# Patient Record
Sex: FEMALE | Race: WHITE | Employment: FULL TIME | URBAN - METROPOLITAN AREA
[De-identification: names, ages, dates, MRNs, and addresses within clinical notes are randomized per-mention and may not be internally consistent; named-entity substitution may affect disease eponyms.]

---

## 2017-12-09 ENCOUNTER — HOSPITAL ENCOUNTER (EMERGENCY)
Age: 22
Discharge: HOME | End: 2017-12-09
Attending: EMERGENCY MEDICINE
Payer: COMMERCIAL

## 2017-12-09 VITALS
DIASTOLIC BLOOD PRESSURE: 76 MMHG | OXYGEN SATURATION: 98 % | HEART RATE: 79 BPM | BODY MASS INDEX: 29.03 KG/M2 | HEIGHT: 67 IN | TEMPERATURE: 97.6 F | RESPIRATION RATE: 16 BRPM | WEIGHT: 185 LBS | SYSTOLIC BLOOD PRESSURE: 130 MMHG

## 2017-12-09 DIAGNOSIS — T50.B95A ADVERSE REACTION TO INFLUENZA VACCINE, INITIAL ENCOUNTER: Primary | ICD-10-CM

## 2017-12-09 PROCEDURE — 99283 EMERGENCY DEPT VISIT LOW MDM: CPT

## 2017-12-09 PROCEDURE — 74011250637 HC RX REV CODE- 250/637: Performed by: EMERGENCY MEDICINE

## 2017-12-09 RX ORDER — SWAB
1 SWAB, NON-MEDICATED MISCELLANEOUS DAILY
COMMUNITY
End: 2018-08-10

## 2017-12-09 RX ORDER — IBUPROFEN 600 MG/1
600 TABLET ORAL
Status: COMPLETED | OUTPATIENT
Start: 2017-12-09 | End: 2017-12-09

## 2017-12-09 RX ADMIN — IBUPROFEN 600 MG: 600 TABLET, FILM COATED ORAL at 14:38

## 2017-12-09 NOTE — ED PROVIDER NOTES
HPI Comments: 25 y.o. female with past medical history significant for asthma, PCOS, and gastrointestinal order who presents to the ED with chief complaint of right arm redness. Pt states she received a flu shot in her right upper arm yesterday and now reports a localized reaction with redness, pain, and swelling in the area of the injection. Pt states she has had flu shots in the past without any issues. Pt denies taking any medications for her pain. There are no other acute medical complaints voiced at this time. Social Hx: Works as a . PCP: Fariha Joseph MD    Note written by Chad Jones, as dictated by Jamari Miles MD 2:20 PM     The history is provided by the patient. Past Medical History:   Diagnosis Date    Asthma     allergy induced    Gastrointestinal disorder     stress induced    PCOS (polycystic ovarian syndrome)        History reviewed. No pertinent surgical history. History reviewed. No pertinent family history. Social History     Social History    Marital status:      Spouse name: N/A    Number of children: N/A    Years of education: N/A     Occupational History    Not on file. Social History Main Topics    Smoking status: Never Smoker    Smokeless tobacco: Never Used    Alcohol use No    Drug use: No    Sexual activity: No     Other Topics Concern    Not on file     Social History Narrative         ALLERGIES: Penicillins and Tetanus vaccines and toxoid    Review of Systems   Constitutional: Negative for chills and fever. HENT: Negative for ear pain and sore throat. Eyes: Negative for photophobia and pain. Respiratory: Negative for chest tightness and shortness of breath. Cardiovascular: Negative for chest pain and leg swelling. Gastrointestinal: Negative for abdominal pain, nausea and vomiting. Genitourinary: Negative for dysuria and flank pain.    Musculoskeletal: Negative for back pain and neck pain.        +right arm pain, redness, and swelling   Skin: Negative for wound. Neurological: Negative for dizziness, light-headedness and headaches. All other systems reviewed and are negative. Vitals:    12/09/17 1410   BP: 130/76   Pulse: 79   Resp: 16   Temp: 97.6 °F (36.4 °C)   SpO2: 98%   Weight: 83.9 kg (185 lb)   Height: 5' 7\" (1.702 m)            Physical Exam   Constitutional: She is oriented to person, place, and time. She appears well-developed and well-nourished. No distress. HENT:   Head: Normocephalic and atraumatic. Pulmonary/Chest: Effort normal and breath sounds normal. No respiratory distress. She has no wheezes. Neurological: She is alert and oriented to person, place, and time. Skin: She is not diaphoretic. There is erythema. Circular red raised area with tenderness on the right shoulder   Nursing note and vitals reviewed. MDM  Number of Diagnoses or Management Options  Adverse reaction to influenza vaccine, initial encounter:   Diagnosis management comments: Local reaction to influenza vaccine - no respiratory distress. Recommend motrin and benadryl and d/c home.   No EMC at this time    ED Course       Procedures

## 2017-12-09 NOTE — ED TRIAGE NOTES
Patient got flu shot yesterday morning and now has redness, swelling, and tenderness in surrounding area of injection, right arm.

## 2018-08-10 ENCOUNTER — HOSPITAL ENCOUNTER (EMERGENCY)
Age: 23
Discharge: HOME | End: 2018-08-10
Attending: EMERGENCY MEDICINE
Payer: COMMERCIAL

## 2018-08-10 VITALS
HEART RATE: 105 BPM | HEIGHT: 67 IN | OXYGEN SATURATION: 100 % | BODY MASS INDEX: 30.61 KG/M2 | WEIGHT: 195 LBS | SYSTOLIC BLOOD PRESSURE: 122 MMHG | RESPIRATION RATE: 18 BRPM | DIASTOLIC BLOOD PRESSURE: 59 MMHG | TEMPERATURE: 97.9 F

## 2018-08-10 DIAGNOSIS — R10.84 ABDOMINAL PAIN, GENERALIZED: ICD-10-CM

## 2018-08-10 DIAGNOSIS — R11.2 NON-INTRACTABLE VOMITING WITH NAUSEA, UNSPECIFIED VOMITING TYPE: Primary | ICD-10-CM

## 2018-08-10 LAB
ALBUMIN SERPL-MCNC: 3.9 G/DL (ref 3.5–5)
ALBUMIN/GLOB SERPL: 0.9 {RATIO} (ref 1.1–2.2)
ALP SERPL-CCNC: 83 U/L (ref 45–117)
ALT SERPL-CCNC: 35 U/L (ref 12–78)
AMPHET UR QL SCN: NEGATIVE
ANION GAP SERPL CALC-SCNC: 13 MMOL/L (ref 5–15)
APPEARANCE UR: ABNORMAL
AST SERPL-CCNC: 27 U/L (ref 15–37)
BACTERIA URNS QL MICRO: ABNORMAL /HPF
BARBITURATES UR QL SCN: NEGATIVE
BASOPHILS # BLD: 0 K/UL (ref 0–0.1)
BASOPHILS NFR BLD: 0 % (ref 0–1)
BENZODIAZ UR QL: NEGATIVE
BILIRUB SERPL-MCNC: 0.7 MG/DL (ref 0.2–1)
BILIRUB UR QL: NEGATIVE
BUN SERPL-MCNC: 12 MG/DL (ref 6–20)
BUN/CREAT SERPL: 17 (ref 12–20)
CALCIUM SERPL-MCNC: 9.3 MG/DL (ref 8.5–10.1)
CANNABINOIDS UR QL SCN: NEGATIVE
CHLORIDE SERPL-SCNC: 103 MMOL/L (ref 97–108)
CO2 SERPL-SCNC: 23 MMOL/L (ref 21–32)
COCAINE UR QL SCN: NEGATIVE
COLOR UR: ABNORMAL
CREAT SERPL-MCNC: 0.72 MG/DL (ref 0.55–1.02)
DIFFERENTIAL METHOD BLD: ABNORMAL
DRUG SCRN COMMENT,DRGCM: NORMAL
EOSINOPHIL # BLD: 0.1 K/UL (ref 0–0.4)
EOSINOPHIL NFR BLD: 1 % (ref 0–7)
EPITH CASTS URNS QL MICRO: ABNORMAL /LPF
ERYTHROCYTE [DISTWIDTH] IN BLOOD BY AUTOMATED COUNT: 12.3 % (ref 11.5–14.5)
GLOBULIN SER CALC-MCNC: 4.3 G/DL (ref 2–4)
GLUCOSE SERPL-MCNC: 112 MG/DL (ref 65–100)
GLUCOSE UR STRIP.AUTO-MCNC: NEGATIVE MG/DL
HCG UR QL: NEGATIVE
HCT VFR BLD AUTO: 46.5 % (ref 35–47)
HGB BLD-MCNC: 16.7 G/DL (ref 11.5–16)
HGB UR QL STRIP: NEGATIVE
IMM GRANULOCYTES # BLD: 0 K/UL (ref 0–0.04)
IMM GRANULOCYTES NFR BLD AUTO: 0 % (ref 0–0.5)
KETONES UR QL STRIP.AUTO: >80 MG/DL
LEUKOCYTE ESTERASE UR QL STRIP.AUTO: NEGATIVE
LIPASE SERPL-CCNC: 100 U/L (ref 73–393)
LYMPHOCYTES # BLD: 0.3 K/UL (ref 0.8–3.5)
LYMPHOCYTES NFR BLD: 3 % (ref 12–49)
MCH RBC QN AUTO: 31.3 PG (ref 26–34)
MCHC RBC AUTO-ENTMCNC: 35.9 G/DL (ref 30–36.5)
MCV RBC AUTO: 87.2 FL (ref 80–99)
METHADONE UR QL: NEGATIVE
MONOCYTES # BLD: 0.6 K/UL (ref 0–1)
MONOCYTES NFR BLD: 5 % (ref 5–13)
MUCOUS THREADS URNS QL MICRO: ABNORMAL /LPF
NEUTS BAND NFR BLD MANUAL: 4 %
NEUTS SEG # BLD: 10.5 K/UL (ref 1.8–8)
NEUTS SEG NFR BLD: 87 % (ref 32–75)
NITRITE UR QL STRIP.AUTO: NEGATIVE
OPIATES UR QL: NEGATIVE
PCP UR QL: NEGATIVE
PH UR STRIP: 6 [PH] (ref 5–8)
PLATELET # BLD AUTO: 210 K/UL (ref 150–400)
PMV BLD AUTO: 10.6 FL (ref 8.9–12.9)
POTASSIUM SERPL-SCNC: 3.8 MMOL/L (ref 3.5–5.1)
PROT SERPL-MCNC: 8.2 G/DL (ref 6.4–8.2)
PROT UR STRIP-MCNC: ABNORMAL MG/DL
RBC # BLD AUTO: 5.33 M/UL (ref 3.8–5.2)
RBC #/AREA URNS HPF: ABNORMAL /HPF (ref 0–5)
RBC MORPH BLD: ABNORMAL
SODIUM SERPL-SCNC: 139 MMOL/L (ref 136–145)
SP GR UR REFRACTOMETRY: 1.02 (ref 1–1.03)
UR CULT HOLD, URHOLD: NORMAL
UROBILINOGEN UR QL STRIP.AUTO: 0.2 EU/DL (ref 0.2–1)
WBC # BLD AUTO: 11.5 K/UL (ref 3.6–11)
WBC MORPH BLD: ABNORMAL
WBC URNS QL MICRO: ABNORMAL /HPF (ref 0–4)
XXWBCSUS: 0

## 2018-08-10 PROCEDURE — 74011250637 HC RX REV CODE- 250/637: Performed by: EMERGENCY MEDICINE

## 2018-08-10 PROCEDURE — 99284 EMERGENCY DEPT VISIT MOD MDM: CPT

## 2018-08-10 PROCEDURE — 81001 URINALYSIS AUTO W/SCOPE: CPT | Performed by: EMERGENCY MEDICINE

## 2018-08-10 PROCEDURE — 83690 ASSAY OF LIPASE: CPT | Performed by: EMERGENCY MEDICINE

## 2018-08-10 PROCEDURE — 96374 THER/PROPH/DIAG INJ IV PUSH: CPT

## 2018-08-10 PROCEDURE — 80053 COMPREHEN METABOLIC PANEL: CPT | Performed by: EMERGENCY MEDICINE

## 2018-08-10 PROCEDURE — 74011250636 HC RX REV CODE- 250/636: Performed by: EMERGENCY MEDICINE

## 2018-08-10 PROCEDURE — 85025 COMPLETE CBC W/AUTO DIFF WBC: CPT | Performed by: EMERGENCY MEDICINE

## 2018-08-10 PROCEDURE — 96361 HYDRATE IV INFUSION ADD-ON: CPT

## 2018-08-10 PROCEDURE — 36415 COLL VENOUS BLD VENIPUNCTURE: CPT | Performed by: EMERGENCY MEDICINE

## 2018-08-10 PROCEDURE — 81025 URINE PREGNANCY TEST: CPT

## 2018-08-10 PROCEDURE — 96375 TX/PRO/DX INJ NEW DRUG ADDON: CPT

## 2018-08-10 PROCEDURE — 80307 DRUG TEST PRSMV CHEM ANLYZR: CPT | Performed by: EMERGENCY MEDICINE

## 2018-08-10 RX ORDER — HALOPERIDOL 5 MG/ML
5 INJECTION INTRAMUSCULAR
Status: COMPLETED | OUTPATIENT
Start: 2018-08-10 | End: 2018-08-10

## 2018-08-10 RX ORDER — ONDANSETRON 4 MG/1
4 TABLET, ORALLY DISINTEGRATING ORAL
Status: COMPLETED | OUTPATIENT
Start: 2018-08-10 | End: 2018-08-10

## 2018-08-10 RX ORDER — ONDANSETRON 4 MG/1
4 TABLET, ORALLY DISINTEGRATING ORAL
Qty: 10 TAB | Refills: 0 | Status: SHIPPED | OUTPATIENT
Start: 2018-08-10

## 2018-08-10 RX ORDER — KETOROLAC TROMETHAMINE 30 MG/ML
15 INJECTION, SOLUTION INTRAMUSCULAR; INTRAVENOUS
Status: COMPLETED | OUTPATIENT
Start: 2018-08-10 | End: 2018-08-10

## 2018-08-10 RX ADMIN — HALOPERIDOL LACTATE 5 MG: 5 INJECTION, SOLUTION INTRAMUSCULAR at 19:01

## 2018-08-10 RX ADMIN — ONDANSETRON 4 MG: 4 TABLET, ORALLY DISINTEGRATING ORAL at 18:10

## 2018-08-10 RX ADMIN — SODIUM CHLORIDE 1000 ML: 900 INJECTION, SOLUTION INTRAVENOUS at 18:27

## 2018-08-10 RX ADMIN — KETOROLAC TROMETHAMINE 15 MG: 30 INJECTION, SOLUTION INTRAMUSCULAR at 19:00

## 2018-08-10 NOTE — ED NOTES
Patient tolerated PO fluids. Patient discharged with written and verbal discharge instruction on abdominal pain, nausea and vomiting by this RN. Patient verbalized understanding of all instruction and denied any further questions prior to discharge. Patient ambulatory from ED accompanied by family x3. Spouse providing transportation.

## 2018-08-10 NOTE — ED TRIAGE NOTES
Pt states that at about 0300 this afternoon she began with terrible epigastric pain along with at least 30 episodes of vomiting and 8 episodes of loose stools not diarrhea. When she has had this in the past it has been related to an infection like a UTI.   She is also very bloated today

## 2018-08-10 NOTE — ED NOTES
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 Pt states that she was not the only one not wearing gloves to do inventory.

## 2018-08-10 NOTE — ED PROVIDER NOTES
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 Past Medical History:   Diagnosis Date    Asthma     allergy induced    Gastrointestinal disorder     stress induced    PCOS (polycystic ovarian syndrome)        No past surgical history on file. No family history on file. Social History     Social History    Marital status:      Spouse name: N/A    Number of children: N/A    Years of education: N/A     Occupational History    Not on file. Social History Main Topics    Smoking status: Never Smoker    Smokeless tobacco: Never Used    Alcohol use No    Drug use: No    Sexual activity: No     Other Topics Concern    Not on file     Social History Narrative         ALLERGIES: Penicillins and Tetanus vaccines and toxoid    Review of Systems   Constitutional: Positive for appetite change and chills. HENT: Negative for drooling, trouble swallowing and voice change. Eyes: Negative for photophobia. Respiratory: Negative for cough, chest tightness and shortness of breath. Gastrointestinal: Positive for abdominal distention, abdominal pain, diarrhea, nausea and vomiting. Genitourinary: Positive for dysuria. Negative for vaginal bleeding and vaginal discharge. Skin: Negative for rash. Neurological: Negative for dizziness, syncope, weakness and light-headedness. All other systems reviewed and are negative.       There were no vitals filed for this visit. Physical Exam   Constitutional: She is oriented to person, place, and time. She appears well-developed and well-nourished. No distress. Uncomfortable appearing, AxOx4, speaking in complete sentences   HENT:   Head: Normocephalic and atraumatic. Nose: Nose normal.   Mouth/Throat: Oropharynx is clear and moist.   Eyes: Conjunctivae and EOM are normal. Pupils are equal, round, and reactive to light. Right eye exhibits no discharge. Left eye exhibits no discharge. No scleral icterus. Neck: Normal range of motion. Neck supple. No JVD present. No tracheal deviation present. Cardiovascular: Regular rhythm, normal heart sounds and intact distal pulses. Exam reveals no gallop and no friction rub. No murmur heard. tachycardic   Pulmonary/Chest: Effort normal and breath sounds normal. No respiratory distress. She has no wheezes. She has no rales. She exhibits no tenderness. Abdominal: Soft. Bowel sounds are normal. There is no tenderness. There is no rebound and no guarding. nttp     Genitourinary: No vaginal discharge found. Genitourinary Comments: pt has motor/ CV/ Sensation grossly intact to all extremities, R = L in strength;   Musculoskeletal: Normal range of motion. She exhibits no edema or tenderness. Neurological: She is alert and oriented to person, place, and time. She has normal reflexes. No cranial nerve deficit. She exhibits normal muscle tone. Coordination normal.   Skin: Skin is warm and dry. No rash noted. No erythema. No pallor. Psychiatric: She has a normal mood and affect. Her behavior is normal. Thought content normal.   Nursing note and vitals reviewed. [facility]      ED Course       Procedures    6:44 PM  UPT neg;     6:53 PM  'Im still nauseated'; will treat w/ Haldol;     'Feeling much better/ tolerating PO/ agrees w/ plans;   Leandrew Benjamín  results have been reviewed with her. She has been counseled regarding her diagnosis.   She verbally conveys understanding and agreement of the signs, symptoms, diagnosis, treatment and prognosis and additionally agrees to Call/ Arrange follow up as recommended with Dr. Jose Hernandez, JURGEN in 24 - 48 hours. She also agrees with the care-plan and conveys that all of her questions have been answered. I have also put together some discharge instructions for her that include: 1) educational information regarding their diagnosis, 2) how to care for their diagnosis at home, as well a 3) list of reasons why they would want to return to the ED prior to their follow-up appointment, should their condition change or for concerns.

## 2018-08-10 NOTE — DISCHARGE INSTRUCTIONS
Nausea and Vomiting: Care Instructions  Your Care Instructions    When you are nauseated, you may feel weak and sweaty and notice a lot of saliva in your mouth. Nausea often leads to vomiting. Most of the time you do not need to worry about nausea and vomiting, but they can be signs of other illnesses. Two common causes of nausea and vomiting are stomach flu and food poisoning. Nausea and vomiting from viral stomach flu will usually start to improve within 24 hours. Nausea and vomiting from food poisoning may last from 12 to 48 hours. The doctor has checked you carefully, but problems can develop later. If you notice any problems or new symptoms, get medical treatment right away. Follow-up care is a key part of your treatment and safety. Be sure to make and go to all appointments, and call your doctor if you are having problems. It's also a good idea to know your test results and keep a list of the medicines you take. How can you care for yourself at home? · To prevent dehydration, drink plenty of fluids, enough so that your urine is light yellow or clear like water. Choose water and other caffeine-free clear liquids until you feel better. If you have kidney, heart, or liver disease and have to limit fluids, talk with your doctor before you increase the amount of fluids you drink. · Rest in bed until you feel better. · When you are able to eat, try clear soups, mild foods, and liquids until all symptoms are gone for 12 to 48 hours. Other good choices include dry toast, crackers, cooked cereal, and gelatin dessert, such as Jell-O. When should you call for help? Call 911 anytime you think you may need emergency care. For example, call if:    · You passed out (lost consciousness).    Call your doctor now or seek immediate medical care if:    · You have symptoms of dehydration, such as:  ¨ Dry eyes and a dry mouth. ¨ Passing only a little dark urine.   ¨ Feeling thirstier than usual.     · You have new or worsening belly pain.     · You have a new or higher fever.     · You vomit blood or what looks like coffee grounds.    Watch closely for changes in your health, and be sure to contact your doctor if:    · You have ongoing nausea and vomiting.     · Your vomiting is getting worse.     · Your vomiting lasts longer than 2 days.     · You are not getting better as expected. Where can you learn more? Go to http://colten-mariya.info/. Enter 25 [id number] in the search box to learn more about \"Nausea and Vomiting: Care Instructions. \"  Current as of: November 20, 2017  Content Version: 11.7  © 6583-9526 [employer]. Care instructions adapted under license by [employer] (which disclaims liability or warranty for this information). If you have questions about a medical condition or this instruction, always ask your healthcare professional. [address] any warranty or liability for your use of this information. Abdominal Pain: Care Instructions  Your Care Instructions    Abdominal pain has many possible causes. Some aren't serious and get better on their own in a few days. Others need more testing and treatment. If your pain continues or gets worse, you need to be rechecked and may need more tests to find out what is wrong. You may need surgery to correct the problem. Don't ignore new symptoms, such as fever, nausea and vomiting, urination problems, pain that gets worse, and dizziness. These may be signs of a more serious problem. Your doctor may have recommended a follow-up visit in the next 8 to 12 hours. If you are not getting better, you may need more tests or treatment. The doctor has checked you carefully, but problems can develop later. If you notice any problems or new symptoms, get medical treatment right away. Follow-up care is a key part of your treatment and safety.  Be sure to make and go to all appointments, and call your doctor if you are having problems. It's also a good idea to know your test results and keep a list of the medicines you take. How can you care for yourself at home? · Rest until you feel better. · To prevent dehydration, drink plenty of fluids, enough so that your urine is light yellow or clear like water. Choose water and other caffeine-free clear liquids until you feel better. If you have kidney, heart, or liver disease and have to limit fluids, talk with your doctor before you increase the amount of fluids you drink. · If your stomach is upset, eat mild foods, such as rice, dry toast or crackers, bananas, and applesauce. Try eating several small meals instead of two or three large ones. · Wait until 48 hours after all symptoms have gone away before you have spicy foods, alcohol, and drinks that contain caffeine. · Do not eat foods that are high in fat. · Avoid anti-inflammatory medicines such as aspirin, ibuprofen (Advil, Motrin), and naproxen (Aleve). These can cause stomach upset. Talk to your doctor if you take daily aspirin for another health problem. When should you call for help? Call 911 anytime you think you may need emergency care. For example, call if:    · You passed out (lost consciousness).     · You pass maroon or very bloody stools.     · You vomit blood or what looks like coffee grounds.     · You have new, severe belly pain.    Call your doctor now or seek immediate medical care if:    · Your pain gets worse, especially if it becomes focused in one area of your belly.     · You have a new or higher fever.     · Your stools are black and look like tar, or they have streaks of blood.     · You have unexpected vaginal bleeding.     · You have symptoms of a urinary tract infection. These may include:  ¨ Pain when you urinate.   ¨ Urinating more often than usual.  ¨ Blood in your urine.     · You are dizzy or lightheaded, or you feel like you may faint.    Watch closely for changes in your health, and be sure to contact your doctor if:    · You are not getting better after 1 day (24 hours). Where can you learn more? Go to http://colten-mariya.info/. Enter R351 in the search box to learn more about \"Abdominal Pain: Care Instructions. \"  Current as of: November 20, 2017  Content Version: 11.7  © 2278-8577 [employer]. Care instructions adapted under license by [employer] (which disclaims liability or warranty for this information). If you have questions about a medical condition or this instruction, always ask your healthcare professional. [place] [address] any warranty or liability for your use of this information.

## 2018-08-10 NOTE — ED NOTES
Patient provided ginger ale approved by CHARO BROWN for PO challenge at this time. Family x3 remain at bedside.

## 2018-08-10 NOTE — ED NOTES
Bedside shift change report given to Janeen Thompson (oncoming nurse) by Yue Carvajal. Ag Owens RN (offgoing nurse). Report included the following information SBAR, MAR, Pain, vitals, all test results.

## 2018-08-10 NOTE — ED NOTES
Assumed care, pt resting in bed, call bell within reach. Patient provided warm blankets x2 and adjusted bed into position of comfort. Patient reports pain has decreased to 3/10 at this time.

## 2018-08-10 NOTE — Clinical Note
Thank you for allowing us to provide you with medical care today. We realize that you have many choices for your emergency care needs. We thank you for choosing [facility]. Please choose us in the future for any continued health care needs. We hope we addressed all of your medical concerns. We strive to provide excellent quality care in the Emergency Department. Anything less than excellent does not meet our expectations. The exam and treatment you received in the Emergency Departmen t were for an emergent problem and are not intended as complete care. It is important that you follow up with a doctor, nurse practitioner, or [address] [place] [address] assistant for ongoing care. If your symptoms worsen or you do not improve as expected and you are u nable to reach your usual health care provider, you should return to the Emergency Department. We are available 24 hours a day. Take this sheet with you when you go to your follow-up visit. If you have any problem arranging the follow-up visit, c ontact the Emergency Department immediately. Make an appointment your family doctor for follow up of this visit. Return to the ER if you are unable to be seen in a timely manner.

## 2021-06-08 ENCOUNTER — TRANSCRIBE ORDER (OUTPATIENT)
Dept: GENERAL RADIOLOGY | Age: 26
End: 2021-06-08

## 2021-06-08 ENCOUNTER — HOSPITAL ENCOUNTER (OUTPATIENT)
Dept: GENERAL RADIOLOGY | Age: 26
Discharge: HOME | End: 2021-06-08
Payer: OTHER MISCELLANEOUS

## 2021-06-08 DIAGNOSIS — T14.8XXA CONTUSION: Primary | ICD-10-CM

## 2021-06-08 DIAGNOSIS — T14.8XXA CONTUSION: ICD-10-CM

## 2021-06-08 PROCEDURE — 73130 X-RAY EXAM OF HAND: CPT

## 2021-08-17 ENCOUNTER — APPOINTMENT (OUTPATIENT)
Dept: GENERAL RADIOLOGY | Age: 26
End: 2021-08-17
Attending: PHYSICIAN ASSISTANT
Payer: OTHER MISCELLANEOUS

## 2021-08-17 ENCOUNTER — HOSPITAL ENCOUNTER (EMERGENCY)
Age: 26
Discharge: HOME | End: 2021-08-17
Attending: EMERGENCY MEDICINE
Payer: OTHER MISCELLANEOUS

## 2021-08-17 VITALS
DIASTOLIC BLOOD PRESSURE: 87 MMHG | BODY MASS INDEX: 27.47 KG/M2 | RESPIRATION RATE: 16 BRPM | WEIGHT: 175 LBS | HEART RATE: 83 BPM | TEMPERATURE: 97.7 F | HEIGHT: 67 IN | OXYGEN SATURATION: 97 % | SYSTOLIC BLOOD PRESSURE: 146 MMHG
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
## 2021-08-17 DIAGNOSIS — Z23 NEED FOR RABIES VACCINATION: ICD-10-CM

## 2021-08-17 DIAGNOSIS — Z23 NEED FOR TETANUS BOOSTER: ICD-10-CM

## 2021-08-17 PROCEDURE — 99281 EMR DPT VST MAYX REQ PHY/QHP: CPT

## 2021-08-17 PROCEDURE — 73130 X-RAY EXAM OF HAND: CPT

## 2021-08-17 PROCEDURE — 74011250636 HC RX REV CODE- 250/636: Performed by: PHYSICIAN ASSISTANT

## 2021-08-17 PROCEDURE — 90715 TDAP VACCINE 7 YRS/> IM: CPT | Performed by: PHYSICIAN ASSISTANT

## 2021-08-17 PROCEDURE — 96372 THER/PROPH/DIAG INJ SC/IM: CPT

## 2021-08-17 PROCEDURE — 90675 RABIES VACCINE IM: CPT | Performed by: PHYSICIAN ASSISTANT

## 2021-08-17 PROCEDURE — 90471 IMMUNIZATION ADMIN: CPT

## 2021-08-17 PROCEDURE — 90375 RABIES IG IM/SC: CPT | Performed by: PHYSICIAN ASSISTANT

## 2021-08-17 PROCEDURE — 90472 IMMUNIZATION ADMIN EACH ADD: CPT

## 2021-08-17 RX ORDER — BACITRACIN 500 UNIT/G
1 PACKET (EA) TOPICAL
Status: DISCONTINUED | OUTPATIENT
Start: 2021-08-17 | End: 2021-08-17 | Stop reason: HOSPADM

## 2021-08-17 RX ADMIN — RABIES IMMUNE GLOBULIN (HUMAN) 1590 UNITS: 300 INJECTION, SOLUTION INFILTRATION; INTRAMUSCULAR at 15:06

## 2021-08-17 RX ADMIN — RABIES VACCINE 2.5 UNITS: KIT at 15:00

## 2021-08-17 RX ADMIN — TETANUS TOXOID, REDUCED DIPHTHERIA TOXOID AND ACELLULAR PERTUSSIS VACCINE, ADSORBED 0.5 ML: 5; 2.5; 8; 8; 2.5 SUSPENSION INTRAMUSCULAR at 15:03

## 2021-08-17 NOTE — PROGRESS NOTES
8/17/2021  4:16 PM  Case management note    Patient was bit by a dog with no vaccines. 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 I have faxed order and demographics to Carli Orozco. Will continue to follow.   Elizabeth Levy

## 2021-08-17 NOTE — ED TRIAGE NOTES
Patient presents with a dog bite to her left 2nd finger. Dog bit her yesterday and is not vaccinated.

## 2021-08-18 NOTE — ED PROVIDER NOTES
Peggy Swenson is a 32 y.o. female who presents to ED with cc of evaluation of dog bite to left second finger. States this occurred yesterday while she was on a police call while on the highway, was speaking to a man involved in the accident, when his dog jumped up and got her finger. She notes dog is not up-to-date on its rabies vaccinations. States she has rinsed the region well.  urgent care facility yesterday but they were unable to give rabies series. Notes they prescribed her doxycycline. Denies fevers, chills. Note she had a fever and felt somewhat delirious with tetanus vaccine in the past and notes her tetanus is not up-to-date at this time. Pt denies additional symptoms of F/C, cough, congestion, CP, SOB, urinary or fecal changes, syncope, visual changes, neck stiffness. PMHx: Reviewed, as below. PSHx: Reviewed, as below. PCP: None    There are no other complaints verbalized at this time. Past Medical History:   Diagnosis Date    Asthma     allergy induced    Gastrointestinal disorder     stress induced    PCOS (polycystic ovarian syndrome)        No past surgical history on file. No family history on file.     Social History     Socioeconomic History    Marital status:      Spouse name: Not on file    Number of children: Not on file    Years of education: Not on file    Highest education level: Not on file   Occupational History    Not on file   Tobacco Use    Smoking status: Never Smoker    Smokeless tobacco: Never Used   Substance and Sexual Activity    Alcohol use: Yes     Comment: once a month    Drug use: No    Sexual activity: Never   Other Topics Concern    Not on file   Social History Narrative    Not on file     Social Determinants of Health     Financial Resource Strain:     Difficulty of Paying Living Expenses:    Food Insecurity:     Worried About Running Out of Food in the Last Year:     [address] in the Last Year: Transportation Needs:     Lack of Transportation (Medical):  Lack of Transportation (Non-Medical):    Physical Activity:     Days of Exercise per Week:     Minutes of Exercise per Session:    Stress:     Feeling of Stress :    Social Connections:     Frequency of Communication with Friends and Family:     Frequency of Social Gatherings with Friends and Family:     Attends [employer] Services:     Active Member of Clubs or Organizations:     Attends Club or Organization Meetings:     Marital Status:    Intimate Partner Violence:     Fear of Current or Ex-Partner:     Emotionally Abused:     Physically Abused:     Sexually Abused: ALLERGIES: Penicillins and Tetanus vaccines and toxoid    Review of Systems   Constitutional: Negative for chills and fever. HENT: Negative for congestion. Eyes: Negative for visual disturbance. Respiratory: Negative for cough and shortness of breath. Cardiovascular: Negative for chest pain. Gastrointestinal: Negative for constipation and diarrhea. Genitourinary: Negative for dysuria. Musculoskeletal: Positive for arthralgias. Negative for neck pain. Skin: Positive for wound. Neurological: Negative for syncope. All other systems reviewed and are negative. Vitals:    08/17/21 1350   BP: (!) 146/87   Pulse: 83   Resp: 16   Temp: 97.7 °F (36.5 °C)   SpO2: 97%   Weight: 79.4 kg (175 lb)   Height: 5' 7\" (1.702 m)            Physical Exam  Vitals and nursing note reviewed. Constitutional:       Appearance: Normal appearance. She is not toxic-appearing or diaphoretic. HENT:      Head: Atraumatic. Right Ear: External ear normal.      Left Ear: External ear normal.   Eyes:      Conjunctiva/sclera: Conjunctivae normal.   Cardiovascular:      Rate and Rhythm: Normal rate. Pulmonary:      Effort: No respiratory distress. Abdominal:      General: There is no distension. Musculoskeletal:         General: No swelling or deformity. Cervical back: Neck supple. Skin:     General: Skin is warm and dry. Comments: Small abrasion noted skin at base of nail bed of left second digit. Sensation intact distally. Some tenderness to palpation PIP. Flexion extension noted intact. No further tenderness to palpation. Neurological:      Mental Status: She is alert and oriented to person, place, and time. Mental status is at baseline. Gait: Gait normal.          MDM  Number of Diagnoses or Management Options     Amount and/or Complexity of Data Reviewed  Tests in the radiology section of CPT®: ordered and reviewed  Discuss the patient with other providers: yes (Dr. Valerio Walter, ED attending)           Procedures          Patient noted to have had no adverse reaction to either tetanus booster or rabies series. We have discussed close return precautions. VITAL SIGNS:  Vitals:    08/17/21 1350   BP: (!) 146/87   Pulse: 83   Resp: 16   Temp: 97.7 °F (36.5 °C)   SpO2: 97%   Weight: 79.4 kg (175 lb)   Height: 5' 7\" (1.702 m)         LABS:  No results found for this or any previous visit (from the past 24 hour(s)). IMAGING:  XR HAND LT MIN 3 V   Final Result   No acute abnormality. Medications During Visit:  Medications   diph,Pertuss(AC),Tet Vac-PF (BOOSTRIX) suspension 0.5 mL (0.5 mL IntraMUSCular Given 8/17/21 1503)   rabies immune globulin (PF) (HYPERRAB) injection 1,590 Units (1,590 Units IntraMUSCular Given 8/17/21 1506)   rabies vaccine, PCEC (RABAVERT) kit 2.5 Units (2.5 Units IntraMUSCular Given 8/17/21 1500)         DECISION MAKING:    Samm Ford is a 32 y.o. female who comes in as above. Presents afebrile and hemodynamically stable. Had dog bite to her finger yesterday. Had been placed on course of doxycycline by outpatient facility. Wound has been washed thoroughly here and is noted to be small and not amenable to sutures. X-ray without acute abnormality. Tetanus has been updated today.   Rabies immunoglobulin and first dose and series has been administered today and patient has noted no adverse reaction. Will discharge with continued rabies series and case management has been called consulted for such. I have discussed care with ED attending. Pt and I have discussed close return precautions as well as follow up recommendations. Opportunity for questions presented. Pt verbalizes their understanding and agreement with care plan. IMPRESSION:  1. Dog bite, initial encounter    2. Need for tetanus booster    3. Need for rabies vaccination        DISPOSITION:  Discharged      Discharge Medication List as of 8/17/2021  3:41 PM      START taking these medications    Details   rabies vaccine human Diploid, PF, (IMOVAX) 2.5 unit solr injection 1 mL by IntraMUSCular route once for 1 dose. Day 0 -  8/17/2021 - Given in ED  Day 3 -   8/20/2021     Day 7-    8/24/2021      Day 14-  8/31/2021    Indication: Rabies Exposure  Prescription date: 8/17/2021   Time: 3:34 PM, Print, Disp-1 mL, R-0         CONTINUE these medications which have NOT CHANGED    Details   ondansetron (ZOFRAN ODT) 4 mg disintegrating tablet Take 1 Tab by mouth every eight (8) hours as needed for Nausea. , Print, Disp-10 Tab, R-0              Follow-up Information     Follow up With Specialties Details Why [address] [facility]  Schedule an appointment as soon as possible for a visit   [address] [place] [address]  [phone]    [facility] EMERGENCY DEPT Emergency Medicine Go to  As needed [address] [facility]  [phone]            The patient is asked to follow-up with their primary care provider and any other physicians as above in the next several days. They are to call tomorrow for an appointment.   We have discussed strict return precautions and the patient is asked to return promptly for any increased concerns or worsening of symptoms and for return precautions regarding their symptoms today. They can return to this emergency department or any other emergency department. I have discussed with them results as above and presented opportunity for questions. They verbalize their understanding of the aboveand agreement with care plan. Please note that this dictation was completed with "[employer] the computer voice recognition software. Quite often unanticipated grammatical, syntax, homophones, and other interpretive errors are inadvertently transcribed by the computer software. Please disregard these errors. Please excuse any errors that have escaped final proofreading.

## 2021-08-20 ENCOUNTER — HOSPITAL ENCOUNTER (OUTPATIENT)
Dept: INFUSION THERAPY | Age: 26
Discharge: HOME | End: 2021-08-20
Payer: OTHER MISCELLANEOUS

## 2021-08-20 VITALS
BODY MASS INDEX: 27.47 KG/M2 | HEART RATE: 81 BPM | RESPIRATION RATE: 20 BRPM | SYSTOLIC BLOOD PRESSURE: 151 MMHG | DIASTOLIC BLOOD PRESSURE: 83 MMHG | OXYGEN SATURATION: 98 % | HEIGHT: 67 IN | WEIGHT: 175 LBS | TEMPERATURE: 98.7 F

## 2021-08-20 PROCEDURE — 90675 RABIES VACCINE IM: CPT | Performed by: PHYSICIAN ASSISTANT

## 2021-08-20 PROCEDURE — 90471 IMMUNIZATION ADMIN: CPT

## 2021-08-20 PROCEDURE — 96372 THER/PROPH/DIAG INJ SC/IM: CPT

## 2021-08-20 PROCEDURE — 74011250636 HC RX REV CODE- 250/636: Performed by: PHYSICIAN ASSISTANT

## 2021-08-20 RX ADMIN — RABIES VACCINE 2.5 UNITS: KIT at 13:56

## 2021-08-20 NOTE — PROGRESS NOTES
[facility]    Date: 2021    Name: Stefania Escudero    MRN: [id number]         : 1995    Ms. Manoj Dickey arrived ambulatory and in no distress for Rabies Day #3 Injection. Assessment was completed, no acute issues at this time, no new complaints voiced. Ms. Carlos Brink vitals were reviewed. Visit Vitals  BP (!) 151/83 (BP 1 Location: Right upper arm, BP Patient Position: At rest;Sitting)   Pulse 81   Temp 98.7 °F (37.1 °C)   Resp 20   Ht 5' 7\" (1.702 m)   Wt 79.4 kg (175 lb)   SpO2 98%   Breastfeeding No   BMI 27.41 kg/m²       Patient states her blood pressure likely elevated d/t conflict with her mother prior to appointment. Medications:  Medications Administered     rabies vaccine, PCEC (RABAVERT) kit 2.5 Units     Admin Date  2021 Action  Given Dose  2.5 Units Route  IntraMUSCular Administered By  Marily Joyce RN                Given left deltoid    Ms. Ronquillo tolerated treatment well and was discharged from [place] [address] in stable condition. She is to return on  at 1430 for her next appointment.     Aman Covarrubias RN  2021

## 2021-08-24 ENCOUNTER — HOSPITAL ENCOUNTER (OUTPATIENT)
Dept: INFUSION THERAPY | Age: 26
Discharge: HOME | End: 2021-08-24
Payer: OTHER MISCELLANEOUS

## 2021-08-24 VITALS
SYSTOLIC BLOOD PRESSURE: 117 MMHG | BODY MASS INDEX: 27.31 KG/M2 | DIASTOLIC BLOOD PRESSURE: 62 MMHG | OXYGEN SATURATION: 99 % | HEIGHT: 67 IN | WEIGHT: 174 LBS | HEART RATE: 75 BPM | TEMPERATURE: 98 F | RESPIRATION RATE: 18 BRPM

## 2021-08-24 PROCEDURE — 96372 THER/PROPH/DIAG INJ SC/IM: CPT

## 2021-08-24 PROCEDURE — 74011250636 HC RX REV CODE- 250/636: Performed by: PHYSICIAN ASSISTANT

## 2021-08-24 PROCEDURE — 90675 RABIES VACCINE IM: CPT | Performed by: PHYSICIAN ASSISTANT

## 2021-08-24 RX ADMIN — RABIES VACCINE 2.5 UNITS: KIT at 15:29

## 2021-08-24 NOTE — PROGRESS NOTES
[facility]    Date: 2021    Name: Clemente Garrison    MRN: [id number]         : 1995    Ms. Rachael Villegas Arrived ambulatory and in no distress for Day 4 of Rabies Vaccine. Assessment was completed, no acute issues at this time, no new complaints voiced. .    No flowsheet data found. Ms. Naomi Mittal vitals were reviewed. Visit Vitals  /62   Pulse 75   Temp 98 °F (36.7 °C)   Resp 18   Ht 5' 7\" (1.702 m)   Wt 78.9 kg (174 lb)   SpO2 99%   BMI 27.25 kg/m²       Lab results were obtained and reviewed. No results found for this or any previous visit (from the past 12 hour(s)). Medications:  Medications Administered     rabies vaccine, PCEC (RABAVERT) kit 2.5 Units     Admin Date  2021 Action  Given Dose  2.5 Units Route  IntraMUSCular Administered By  Nuzhat Cantrell RN            RUE injection    Ms. Ronquillo tolerated treatment well and was discharged from [place] [address] in stable condition at 1440. She is to return on  at 1600 for her next appointment.     Lewis Morrow RN  2021

## 2021-08-31 ENCOUNTER — HOSPITAL ENCOUNTER (OUTPATIENT)
Dept: INFUSION THERAPY | Age: 26
Discharge: HOME | End: 2021-08-31
Payer: OTHER MISCELLANEOUS

## 2021-08-31 VITALS
DIASTOLIC BLOOD PRESSURE: 82 MMHG | RESPIRATION RATE: 16 BRPM | HEART RATE: 71 BPM | OXYGEN SATURATION: 99 % | TEMPERATURE: 96.9 F | SYSTOLIC BLOOD PRESSURE: 125 MMHG

## 2021-08-31 PROCEDURE — 90675 RABIES VACCINE IM: CPT | Performed by: PHYSICIAN ASSISTANT

## 2021-08-31 PROCEDURE — 74011250636 HC RX REV CODE- 250/636: Performed by: PHYSICIAN ASSISTANT

## 2021-08-31 PROCEDURE — 90471 IMMUNIZATION ADMIN: CPT

## 2021-08-31 RX ADMIN — RABIES VACCINE 2.5 UNITS: KIT at 16:08

## 2021-08-31 NOTE — PROGRESS NOTES
[facility] Progress Note    Date: 2021    Name: Katelynn Hollingsworth    MRN: [id number]         : 1995    Ms. Jazmyn Hurd Arrived ambulatory and in no distress for Rabies Vaccine Injection. Assessment was completed, no acute issues at this time, no new complaints voiced. Ms. Magnolia Centeno vitals were reviewed. Visit Vitals  /82   Pulse 71   Temp 96.9 °F (36.1 °C)   Resp 16   SpO2 99%     Medications:  Medications Administered     rabies vaccine, PCEC (RABAVERT) kit 2.5 Units     Admin Date  2021 Action  Given Dose  2.5 Units Route  IntraMUSCular Administered By  Lester Beatty RN                  Ms. Jazmyn Hurd tolerated treatment well and was discharged from [place] [address] in stable condition.      Janiya Cedillo RN  2021

## 2023-04-18 NOTE — PROGRESS NOTES
Subjective: (As above and below)     Chief Complaint   Patient presents with    [address] [place] [address] Rd is a 29 y.o. female  and presents to the office to establish care. She denies any family history of cardiac disease or early cardiac deaths. She has history of PCOS which is managed by [facility]. Preventative:   Pap smear: Done at [facility] [place]. Immunization:   Refused COVID vaccine- allergy to the preservative. Nutrition Hx:  Diet: protein bar, yogurt, sandwich, protein, vegetables, starch. Only drinks water throughout the day. Exercise: Cycling    She does not smoke, vape or other substances. She does occasionally drink-but rarely. Occupation: [employer] here in [place].     Overall she is doing well and no major concerns today. Reviewed and agree with Nurse Note duplicated in this note. Reviewed PmHx, RxHx, FmHx, SocHx, AllgHx and updated in chart. Current Outpatient Medications   Medication Sig    elagolix (Orilissa) 150 mg tab Orilissa 150 mg tablet    calcium carbonate (CALCIUM 300 PO) calcium    multivit-minerals/folic acid (MULTIVITAMIN GUMMIES PO) multivitamin     No current facility-administered medications for this visit. Allergies   Allergen Reactions    Latex Rash    Penicillins Other (comments) and Hives     'feels like bee stings\"    Tetanus Vaccines And Toxoid Other (comments)     \"delerium\" fever      Past Medical History:   Diagnosis Date    Asthma     allergy induced    Gastrointestinal disorder     stress induced    PCOS (polycystic ovarian syndrome)       History reviewed. No pertinent surgical history.    Family History   Problem Relation Age of Onset    Other Father         polycystic kidney disease      Social History     Socioeconomic History    Marital status:      Spouse name: Not on file    Number of children: Not on file    Years of education: Not on file    Highest education level: Not on file   Occupational History    Not on file Tobacco Use    Smoking status: Never    Smokeless tobacco: Never   Vaping Use    Vaping Use: Never used   Substance and Sexual Activity    Alcohol use: Yes     Comment: once a month    Drug use: No    Sexual activity: Yes     Partners: Male   Other Topics Concern    Not on file   Social History Narrative    Not on file     Social Determinants of Health     Financial Resource Strain: Not on file   Food Insecurity: Not on file   Transportation Needs: Not on file   Physical Activity: Not on file   Stress: Not on file   Social Connections: Not on file   Intimate Partner Violence: Not on file   Housing Stability: Not on file             Review of Systems   Constitutional:  Negative for chills, diaphoresis, fever, malaise/fatigue and weight loss. HENT: Negative. Eyes: Negative. Respiratory:  Negative for cough and shortness of breath. Cardiovascular:  Negative for chest pain, palpitations and leg swelling. Gastrointestinal:  Negative for abdominal pain, blood in stool, constipation, diarrhea, heartburn, melena, nausea and vomiting. Genitourinary:  Negative for dysuria, flank pain, frequency, hematuria and urgency. Musculoskeletal: Negative. Skin: Negative. Neurological:  Negative for dizziness, tingling, tremors, sensory change, speech change, focal weakness, seizures, loss of consciousness, weakness and headaches. Endo/Heme/Allergies: Negative. Psychiatric/Behavioral: Negative. Objective:     Physical Examination:    Visit Vitals  /79 (BP 1 Location: Left upper arm, BP Patient Position: Sitting, BP Cuff Size: Adult)   Pulse 82   Temp 98.2 °F (36.8 °C) (Temporal)   Resp 17   Ht 5' 7\" (1.702 m)   Wt 186 lb (84.4 kg)   SpO2 96%   BMI 29.13 kg/m²       Physical Exam  Vitals and nursing note reviewed. Constitutional:       General: She is not in acute distress. Appearance: Normal appearance. HENT:      Head: Normocephalic.       Right Ear: Tympanic membrane, ear canal and external ear normal.      Left Ear: Tympanic membrane, ear canal and external ear normal.      Nose: Nose normal.      Mouth/Throat:      Mouth: Mucous membranes are moist.      Pharynx: Oropharynx is clear. Eyes:      Extraocular Movements: Extraocular movements intact. Conjunctiva/sclera: Conjunctivae normal.      Pupils: Pupils are equal, round, and reactive to light. Neck:      Thyroid: No thyromegaly or thyroid tenderness. Vascular: No carotid bruit. Cardiovascular:      Rate and Rhythm: Normal rate and regular rhythm. Pulses: Normal pulses. Heart sounds: Normal heart sounds. Pulmonary:      Effort: Pulmonary effort is normal. No respiratory distress. Breath sounds: Normal breath sounds. Abdominal:      General: Bowel sounds are normal. There is no distension. Palpations: Abdomen is soft. Tenderness: There is no abdominal tenderness. Musculoskeletal:      Cervical back: No tenderness. Right lower leg: No edema. Left lower leg: No edema. Lymphadenopathy:      Cervical: No cervical adenopathy. Skin:     General: Skin is warm and dry. Neurological:      General: No focal deficit present. Mental Status: She is alert and oriented to person, place, and time. Mental status is at baseline. Psychiatric:         Mood and Affect: Mood normal.         Behavior: Behavior normal.         Thought Content: Thought content normal.         Judgment: Judgment normal.           3 most recent PHQ Screens 4/19/2023   Little interest or pleasure in doing things Not at all   Feeling down, depressed, irritable, or hopeless Not at all   Total Score PHQ 2 0      Assessment/ Plan:   Differential diagnosis and treatment options reviewed with patient who is in agreement with treatment plan as outlined below. 1. Encounter for medical examination to establish care  Vitals are stable. She is fasting today. Discussed diet, exercise and lifestyle modifications.      2. Polycystic ovary syndrome  Continue follow-up with GYN. 3. Need for hepatitis C screening test  - HEPATITIS C AB; Future  - HEPATITIS C AB    4. Screening for lipid disorders  - LIPID PANEL; Future  - LIPID PANEL    5. Impaired glucose tolerance  - HEMOGLOBIN A1C WITH EAG; Future  - HEMOGLOBIN A1C WITH EAG    6. Thyroid disorder screening  - TSH 3RD GENERATION; Future  - TSH 3RD GENERATION    7. Laboratory exam ordered as part of routine general medical examination  - CBC WITH AUTOMATED DIFF; Future  - METABOLIC PANEL, COMPREHENSIVE; Future  - METABOLIC PANEL, COMPREHENSIVE  - CBC WITH AUTOMATED DIFF       Follow-up and Dispositions    Return if symptoms worsen or fail to improve, for yearly visit unless lab work is abnormal .          Medication Side Effects and Warnings were discussed with patient: yes  Patient Labs were reviewed: yes  Patient Past Records were reviewed:  yes    Verbal and written instructions (see AVS) provided. Patient expresses understanding and agreement of diagnosis and treatment plan.     Darrel Nowak NP

## 2023-04-19 ENCOUNTER — OFFICE VISIT (OUTPATIENT)
Dept: FAMILY MEDICINE CLINIC | Age: 28
End: 2023-04-19
Payer: OTHER GOVERNMENT

## 2023-04-19 VITALS
TEMPERATURE: 98.2 F | HEIGHT: 67 IN | WEIGHT: 186 LBS | BODY MASS INDEX: 29.19 KG/M2 | HEART RATE: 82 BPM | RESPIRATION RATE: 17 BRPM | DIASTOLIC BLOOD PRESSURE: 79 MMHG | OXYGEN SATURATION: 96 % | SYSTOLIC BLOOD PRESSURE: 119 MMHG

## 2023-04-19 DIAGNOSIS — Z13.29 THYROID DISORDER SCREENING: ICD-10-CM

## 2023-04-19 DIAGNOSIS — Z11.59 NEED FOR HEPATITIS C SCREENING TEST: ICD-10-CM

## 2023-04-19 DIAGNOSIS — Z13.220 SCREENING FOR LIPID DISORDERS: ICD-10-CM

## 2023-04-19 DIAGNOSIS — R73.02 IMPAIRED GLUCOSE TOLERANCE: ICD-10-CM

## 2023-04-19 DIAGNOSIS — Z00.00 LABORATORY EXAM ORDERED AS PART OF ROUTINE GENERAL MEDICAL EXAMINATION: ICD-10-CM

## 2023-04-19 DIAGNOSIS — Z00.00 ENCOUNTER FOR MEDICAL EXAMINATION TO ESTABLISH CARE: Primary | ICD-10-CM

## 2023-04-19 DIAGNOSIS — E28.2 POLYCYSTIC OVARY SYNDROME: ICD-10-CM

## 2023-04-19 PROBLEM — M89.8X1 SCAPULALGIA: Status: ACTIVE | Noted: 2022-01-14

## 2023-04-19 PROBLEM — N92.6 IRREGULAR PERIODS: Status: ACTIVE | Noted: 2017-10-31

## 2023-04-19 PROBLEM — N97.0 FEMALE INFERTILITY ASSOCIATED WITH ANOVULATION: Status: ACTIVE | Noted: 2017-11-09

## 2023-04-19 PROCEDURE — 99203 OFFICE O/P NEW LOW 30 MIN: CPT

## 2023-04-19 RX ORDER — ELAGOLIX 150 MG/1
TABLET, FILM COATED ORAL
COMMUNITY
Start: 2022-07-08

## 2023-04-19 NOTE — PATIENT INSTRUCTIONS
Heart-Healthy Diet: Care Instructions  Your Care Instructions     A heart-healthy diet has lots of vegetables, fruits, nuts, beans, and whole grains, and is low in salt. It limits foods that are high in saturated fat, such as meats, cheeses, and fried foods. It may be hard to change your diet, but even small changes can lower your risk of heart attack and heart disease. Follow-up care is a key part of your treatment and safety. Be sure to make and go to all appointments, and call your doctor if you are having problems. It's also a good idea to know your test results and keep a list of the medicines you take. How can you care for yourself at home? Watch your portions  Use food labels to learn what the recommended servings are for the foods you eat. Eat only the number of calories you need to stay at a healthy weight. If you need to lose weight, eat fewer calories than your body burns (through exercise and other physical activity). Eat more fruits and vegetables  Eat a variety of fruit and vegetables every day. Dark green, deep orange, red, or yellow fruits and vegetables are especially good for you. Examples include spinach, carrots, peaches, and berries. Keep carrots, celery, and other veggies handy for snacks. Buy fruit that is in season and store it where you can see it so that you will be tempted to eat it. Cook dishes that have a lot of veggies in them, such as stir-fries and soups. Limit saturated fat  Read food labels, and try to avoid saturated fats. They increase your risk of heart disease. Use olive or canola oil when you cook. Bake, broil, grill, or steam foods instead of frying them. Choose lean meats instead of high-fat meats such as hot dogs and sausages. Cut off all visible fat when you prepare meat. Eat fish, skinless poultry, and meat alternatives such as soy products instead of high-fat meats. Soy products, such as tofu, may be especially good for your heart.   Choose low-fat or fat-free milk and dairy products. Eat foods high in fiber  Eat a variety of grain products every day. Include whole-grain foods that have lots of fiber and nutrients. Examples of whole-grain foods include oats, whole wheat bread, and brown rice. Buy whole-grain breads and cereals, instead of white bread or pastries. Limit salt and sodium  Limit how much salt and sodium you eat to help lower your blood pressure. Taste food before you salt it. Add only a little salt when you think you need it. With time, your taste buds will adjust to less salt. Eat fewer snack items, fast foods, and other high-salt, processed foods. Check food labels for the amount of sodium in packaged foods. Choose low-sodium versions of canned goods (such as soups, vegetables, and beans). Limit sugar  Limit drinks and foods with added sugar. These include candy, desserts, and soda pop. Limit alcohol  Limit alcohol to no more than 2 drinks a day for men and 1 drink a day for women. Too much alcohol can cause health problems. When should you call for help? Watch closely for changes in your health, and be sure to contact your doctor if:    You would like help planning heart-healthy meals. Where can you learn more? Go to http://colten-mariya.info/  Enter V137 in the search box to learn more about \"Heart-Healthy Diet: Care Instructions. \"  Current as of: June 6, 2022               Content Version: 13.6  © 0812-2965 [employer]wise, [employer]. Care instructions adapted under license by [employer] (which disclaims liability or warranty for this information). If you have questions about a medical condition or this instruction, always ask your healthcare professional. [address] any warranty or liability for your use of this information.

## 2023-04-19 NOTE — PROGRESS NOTES
Chief Complaint   Patient presents with    [address] Maintenance reviewed     1. Have you been to the ER, urgent care clinic since your last visit? Hospitalized since your last visit? No     2. Have you seen or consulted any other health care providers outside of the [address] [place] [address] since your last visit? Include any pap smears or colon screening.   No

## 2023-04-20 LAB
ALBUMIN SERPL-MCNC: 4.3 G/DL (ref 3.5–5)
ALBUMIN/GLOB SERPL: 1.4 (ref 1.1–2.2)
ALP SERPL-CCNC: 114 U/L (ref 45–117)
ALT SERPL-CCNC: 86 U/L (ref 12–78)
ANION GAP SERPL CALC-SCNC: 2 MMOL/L (ref 5–15)
AST SERPL-CCNC: 41 U/L (ref 15–37)
BASOPHILS # BLD: 0.1 K/UL (ref 0–0.1)
BASOPHILS NFR BLD: 1 % (ref 0–1)
BILIRUB SERPL-MCNC: 0.4 MG/DL (ref 0.2–1)
BUN SERPL-MCNC: 13 MG/DL (ref 6–20)
BUN/CREAT SERPL: 22 (ref 12–20)
CALCIUM SERPL-MCNC: 9.8 MG/DL (ref 8.5–10.1)
CHLORIDE SERPL-SCNC: 108 MMOL/L (ref 97–108)
CHOLEST SERPL-MCNC: 261 MG/DL
CO2 SERPL-SCNC: 28 MMOL/L (ref 21–32)
CREAT SERPL-MCNC: 0.58 MG/DL (ref 0.55–1.02)
DIFFERENTIAL METHOD BLD: ABNORMAL
EOSINOPHIL # BLD: 0.2 K/UL (ref 0–0.4)
EOSINOPHIL NFR BLD: 3 % (ref 0–7)
ERYTHROCYTE [DISTWIDTH] IN BLOOD BY AUTOMATED COUNT: 12.2 % (ref 11.5–14.5)
EST. AVERAGE GLUCOSE BLD GHB EST-MCNC: 91 MG/DL
GLOBULIN SER CALC-MCNC: 3.1 G/DL (ref 2–4)
GLUCOSE SERPL-MCNC: 77 MG/DL (ref 65–100)
HBA1C MFR BLD: 4.8 % (ref 4–5.6)
HCT VFR BLD AUTO: 44.2 % (ref 35–47)
HCV AB SERPL QL IA: NONREACTIVE
HDLC SERPL-MCNC: 68 MG/DL
HDLC SERPL: 3.8 (ref 0–5)
HGB BLD-MCNC: 14.3 G/DL (ref 11.5–16)
IMM GRANULOCYTES # BLD AUTO: 0.1 K/UL (ref 0–0.04)
IMM GRANULOCYTES NFR BLD AUTO: 1 % (ref 0–0.5)
LDLC SERPL CALC-MCNC: 178.8 MG/DL (ref 0–100)
LYMPHOCYTES # BLD: 2.4 K/UL (ref 0.8–3.5)
LYMPHOCYTES NFR BLD: 34 % (ref 12–49)
MCH RBC QN AUTO: 30 PG (ref 26–34)
MCHC RBC AUTO-ENTMCNC: 32.4 G/DL (ref 30–36.5)
MCV RBC AUTO: 92.7 FL (ref 80–99)
MONOCYTES # BLD: 0.6 K/UL (ref 0–1)
MONOCYTES NFR BLD: 8 % (ref 5–13)
NEUTS SEG # BLD: 3.7 K/UL (ref 1.8–8)
NEUTS SEG NFR BLD: 53 % (ref 32–75)
NRBC # BLD: 0 K/UL (ref 0–0.01)
NRBC BLD-RTO: 0 PER 100 WBC
PLATELET # BLD AUTO: 224 K/UL (ref 150–400)
PMV BLD AUTO: 11.1 FL (ref 8.9–12.9)
POTASSIUM SERPL-SCNC: 4.1 MMOL/L (ref 3.5–5.1)
PROT SERPL-MCNC: 7.4 G/DL (ref 6.4–8.2)
RBC # BLD AUTO: 4.77 M/UL (ref 3.8–5.2)
SODIUM SERPL-SCNC: 138 MMOL/L (ref 136–145)
TRIGL SERPL-MCNC: 71 MG/DL (ref ?–150)
TSH SERPL DL<=0.05 MIU/L-ACNC: 1.8 UIU/ML (ref 0.36–3.74)
VLDLC SERPL CALC-MCNC: 14.2 MG/DL
WBC # BLD AUTO: 7 K/UL (ref 3.6–11)

## 2023-04-21 NOTE — PROGRESS NOTES
Liver functions are slightly elevated and LDL \"bad cholesterol\" is high. We will monitor liver function and cholesterol. I would like her to avoid alcohol and tylenol if possible. Increase exercise and healthy diet such as more chicken/fish that is baked/grilled, vegetables and whole grains. Please decrease sat/trans fat and refined sugars. We can repeat both fasting cholesterol and liver function in 3 months. Blood counts are unremarkable. Thyroid is normal   Diabetes lab is normal   Hep C is negative.

## 2023-05-01 ENCOUNTER — OFFICE VISIT (OUTPATIENT)
Dept: FAMILY MEDICINE CLINIC | Age: 28
End: 2023-05-01
Payer: OTHER GOVERNMENT

## 2023-05-01 VITALS
HEIGHT: 67 IN | DIASTOLIC BLOOD PRESSURE: 93 MMHG | SYSTOLIC BLOOD PRESSURE: 142 MMHG | RESPIRATION RATE: 18 BRPM | WEIGHT: 186 LBS | TEMPERATURE: 99.4 F | OXYGEN SATURATION: 96 % | BODY MASS INDEX: 29.19 KG/M2 | HEART RATE: 94 BPM

## 2023-05-01 DIAGNOSIS — R05.1 ACUTE COUGH: ICD-10-CM

## 2023-05-01 DIAGNOSIS — B37.9 YEAST INFECTION: ICD-10-CM

## 2023-05-01 DIAGNOSIS — H66.002 NON-RECURRENT ACUTE SUPPURATIVE OTITIS MEDIA OF LEFT EAR WITHOUT SPONTANEOUS RUPTURE OF TYMPANIC MEMBRANE: Primary | ICD-10-CM

## 2023-05-01 DIAGNOSIS — R09.81 NASAL CONGESTION: ICD-10-CM

## 2023-05-01 PROCEDURE — 99213 OFFICE O/P EST LOW 20 MIN: CPT

## 2023-05-01 RX ORDER — FLUCONAZOLE 150 MG/1
150 TABLET ORAL DAILY
Qty: 1 TABLET | Refills: 0 | Status: SHIPPED | OUTPATIENT
Start: 2023-05-01 | End: 2023-05-02

## 2023-05-01 RX ORDER — BENZONATATE 200 MG/1
200 CAPSULE ORAL
Qty: 21 CAPSULE | Refills: 0 | Status: SHIPPED | OUTPATIENT
Start: 2023-05-01 | End: 2023-05-08

## 2023-05-01 RX ORDER — DOXYCYCLINE 100 MG/1
100 CAPSULE ORAL 2 TIMES DAILY
Qty: 20 CAPSULE | Refills: 0 | Status: SHIPPED | OUTPATIENT
Start: 2023-05-01 | End: 2023-05-11

## 2023-05-01 NOTE — PROGRESS NOTES
Subjective:     Chief Complaint   Patient presents with    Sinus Infection    Ear Pain     Both ears     Pressure Behind the Eyes     Left arm     Cough    Nasal Congestion       Ismael Troncoso is a 29 y.o. female who complains of sinus pain, ear pain, sinus pressure, cough and nasal congestion for 3-4 days, worsening since that time. Tried OTC cold remedies (nasacort, sudafed, zyrtec, zicam) without relief. Patient denies smoke cigarettes. She notes that her son was diagnosed with a double ear infection. After that herself and  started getting sick. Initially she had chills and body aches but no fever but these symptoms have resolved. Denies cardiac complaints including chest pain or discomfort, elevated heart rate, or palpitations. Denies respiratory complaints including SOB, difficulty or pain with breathing, wheezes. Denies fever, myalgias, chills, weakness, fatigue and other systemic symptoms. No N/V/D  ROS is otherwise negative. No evaluation to date. No recent travel. FLU VACCINE? Allergy to preservative. Chart reviewed: immunizations are up to date and documented. Past Medical History:   Diagnosis Date    Asthma     allergy induced    Gastrointestinal disorder     stress induced    PCOS (polycystic ovarian syndrome)      Social History     Tobacco Use    Smoking status: Never    Smokeless tobacco: Never   Vaping Use    Vaping Use: Never used   Substance Use Topics    Alcohol use: Yes     Comment: once a month    Drug use: No     Current Outpatient Medications on File Prior to Visit   Medication Sig Dispense Refill    elagolix (Orilissa) 150 mg tab Orilissa 150 mg tablet      calcium carbonate (CALCIUM 300 PO) calcium      multivit-minerals/folic acid (MULTIVITAMIN GUMMIES PO) multivitamin       No current facility-administered medications on file prior to visit.      Allergies   Allergen Reactions    Latex Rash    Penicillins Other (comments) and Hives     'feels like bee stings\"    Tetanus Vaccines And Toxoid Other (comments)     \"delerium\" fever        Review of Systems  A comprehensive review of systems was negative except for that written in the HPI. Agree with nurses note. OBJECTIVE: Visit Vitals  BP (!) 142/93 (BP 1 Location: Left upper arm, BP Patient Position: Sitting, BP Cuff Size: Adult)   Pulse 94   Temp 99.4 °F (37.4 °C) (Temporal)   Resp 18   Ht 5' 7\" (1.702 m)   Wt 186 lb (84.4 kg)   SpO2 96%   BMI 29.13 kg/m²     She appears well, vital signs are as noted above   HEAD:  Normocephalic. Atraumatic. Non tender sinuses x 4. EYE: PERRLA. EOMs intact. Sclera anicteric without injection. No drainage or discharge. EARS: Hearing intact bilaterally. External ear canals normal without evidence of blood or swelling. Right TM is intact, pearly grey with landmarks visible. No erythema or effusion. Right TM is intact but erythematous, bulging and effusion noted. NOSE: Right nare is paten, turbinate is pink. No polyps noted and no discharge. Left nasal turbinate is swollen and slightly erythematous. MOUTH: mucous membranes pink and moist. Posterior pharynx normal with cobblestone appearance. No erythema, white exudate or obstruction. NECK: supple. Midline trachea. RESP: Breath sounds are symmetrical bilaterally. Unlabored without SOB. Speaking in full sentences. Clear to auscultation bilaterally anteriorly and posteriorly. No wheezes. No rales or rhonchi. Non-productive cough when elicited. CV: normal rate. Regular rhythm. S1, S2 audible. No murmur noted. No rubs, clicks or gallops noted. HEME/LYMPH: peripheral pulses palpable 2+ x 4 extremities. No peripheral edema is noted. No cervical adenopathy noted. SKIN: clean dry and intact throughout. no rashes    Assessment/Plan:   Differential diagnosis and treatment options reviewed with patient who is in agreement with treatment plan as outlined below.       ICD-10-CM ICD-9-CM    1. Non-recurrent acute suppurative otitis media of left ear without spontaneous rupture of tympanic membrane  H66.002 382.00 doxycycline (VIBRAMYCIN) 100 mg capsule      2. Nasal congestion  R09.81 478.19       3. Acute cough  R05.1 786.2 benzonatate (TESSALON) 200 mg capsule      4. Yeast infection  B37.9 112.9 fluconazole (DIFLUCAN) 150 mg tablet         She notes when ever she takes antibiotics she gets a yeast infection. Would like oral diflucan in case of yeast infection. She does have slightly elevated liver enzymes will give only one time dose. Did recommend she try OTC monistat first. Discussed risk vs benefits with her regarding oral diflucan. She verbalized understanding. Symptomatic therapy suggested: push fluids, rest, use vaporizer or mist prn, use acetaminophen, ibuprofen, antihistamine-decongestant of choice, cough suppressant of choice prn, apply heat to sinuses prn, and return office visit prn if symptoms persist or worsen. Alternate Ibuprofen with Tylenol every 4 hours as needed for pain and discomfort. OTC decongestants (such as Sudafed) every 4-6 hours as needed for congestion. OTC nasal saline spray  2-3 sprays per nostril 2-4 times a day to clear eustachian tube and assist with post nasal drip symptoms. OTC antihistamines (Zyrtec or Claritin)  to reduce allergic symptoms such as sneezing, runny nose and itchy eyes. OTC Mucinex or Robitussin for cough relief. Scoop of honey to help with cough. Verbal and written instructions (see AVS) provided. Patient expresses understanding and agreement of diagnosis and treatment plan.     F/u if symptoms do not improve or worsen    Bill Saldana NP

## 2023-05-01 NOTE — PROGRESS NOTES
Chief Complaint   Patient presents with    Sinus Infection    Ear Pain     Both ears     Pressure Behind the Eyes     Left arm     Cough    Nasal Congestion     Health Maintenance reviewed     1. Have you been to the ER, urgent care clinic since your last visit? Hospitalized since your last visit? 2. Have you seen or consulted any other health care providers outside of the [address] [place] [address] since your last visit? Include any pap smears or colon screening.   No

## 2023-05-11 RX ORDER — ELAGOLIX 150 MG/1
TABLET, FILM COATED ORAL
COMMUNITY
Start: 2022-07-08

## 2023-05-11 RX ORDER — DOXYCYCLINE HYCLATE 100 MG/1
CAPSULE ORAL 2 TIMES DAILY
COMMUNITY
Start: 2023-05-01 | End: 2023-05-11

## 2024-03-13 NOTE — PERIOP NOTE
[facility]  Ambulatory Surgery Unit  Pre-operative Instructions    Surgery/Procedure Date  Wednesday, March 20, 2024            Tentative Arrival Time TBD      1. On the day of your surgery/procedure, please report to the Ambulatory Surgery Unit Registration Desk and sign in at your designated time. The Ambulatory Surgery Unit is located in [facility] on the [facility] side of the [facility] across from the [facility]. Please have all of your health insurance cards, co-payment, and a photo ID.    **TWO adults may accompany you the day of the procedure.  We have limited seating available.  If our waiting room is at capacity, your ride may be asked to remain in their vehicle.  No one under 15 is allowed in the waiting room.      2. You must have someone stay here during the whole procedure, and able to drive you home, as you should not drive a car for 24 hours following anesthesia. Please make arrangements for a responsible adult friend or family member to stay with you for at least the first 24 hours after your surgery.    3. Do not have anything to eat or drink (including water, gum, mints, coffee, juice) after 11:59 PM, Tuesday. This may not apply to medications prescribed by your physician.  (Please note below the special instructions with medications to take the morning of surgery, if applicable.)    4. We recommend you do not drink any alcoholic beverages for 24 hours before and after your surgery.    5. Contact your surgeon’s office for instructions on the following medications: non-steroidal anti-inflammatory drugs (i.e. Advil, Aleve), vitamins, and supplements. (Some surgeon’s will want you to stop these medications prior to surgery and others may allow you to take them)   **If you are currently taking Plavix, Coumadin, Aspirin and/or other blood-thinning agents, contact your surgeon for instructions.** Your surgeon will partner with the physician prescribing these

## 2024-03-19 ENCOUNTER — ANESTHESIA EVENT (OUTPATIENT)
Facility: HOSPITAL | Age: 29
End: 2024-03-19
Payer: COMMERCIAL

## 2024-03-20 ENCOUNTER — ANESTHESIA (OUTPATIENT)
Facility: HOSPITAL | Age: 29
End: 2024-03-20
Payer: COMMERCIAL

## 2024-03-20 ENCOUNTER — HOSPITAL ENCOUNTER (OUTPATIENT)
Facility: HOSPITAL | Age: 29
Setting detail: OUTPATIENT SURGERY
Discharge: HOME | End: 2024-03-20
Attending: OBSTETRICS & GYNECOLOGY | Admitting: OBSTETRICS & GYNECOLOGY
Payer: COMMERCIAL

## 2024-03-20 VITALS
DIASTOLIC BLOOD PRESSURE: 63 MMHG | BODY MASS INDEX: 29.82 KG/M2 | HEART RATE: 87 BPM | TEMPERATURE: 98.4 F | WEIGHT: 190 LBS | RESPIRATION RATE: 18 BRPM | HEIGHT: 67 IN | SYSTOLIC BLOOD PRESSURE: 119 MMHG | OXYGEN SATURATION: 96 %

## 2024-03-20 DIAGNOSIS — N97.0 FEMALE INFERTILITY ASSOCIATED WITH ANOVULATION: Primary | ICD-10-CM

## 2024-03-20 LAB — HCG UR QL: NEGATIVE

## 2024-03-20 PROCEDURE — 7100000010 HC PHASE II RECOVERY - FIRST 15 MIN: Performed by: OBSTETRICS & GYNECOLOGY

## 2024-03-20 PROCEDURE — 7100000001 HC PACU RECOVERY - ADDTL 15 MIN: Performed by: OBSTETRICS & GYNECOLOGY

## 2024-03-20 PROCEDURE — 2580000003 HC RX 258: Performed by: ANESTHESIOLOGY

## 2024-03-20 PROCEDURE — 6360000002 HC RX W HCPCS: Performed by: NURSE ANESTHETIST, CERTIFIED REGISTERED

## 2024-03-20 PROCEDURE — 81025 URINE PREGNANCY TEST: CPT

## 2024-03-20 PROCEDURE — 3700000000 HC ANESTHESIA ATTENDED CARE: Performed by: OBSTETRICS & GYNECOLOGY

## 2024-03-20 PROCEDURE — 6370000000 HC RX 637 (ALT 250 FOR IP)

## 2024-03-20 PROCEDURE — 3600000014 HC SURGERY LEVEL 4 ADDTL 15MIN: Performed by: OBSTETRICS & GYNECOLOGY

## 2024-03-20 PROCEDURE — 3700000001 HC ADD 15 MINUTES (ANESTHESIA): Performed by: OBSTETRICS & GYNECOLOGY

## 2024-03-20 PROCEDURE — 2500000003 HC RX 250 WO HCPCS: Performed by: NURSE ANESTHETIST, CERTIFIED REGISTERED

## 2024-03-20 PROCEDURE — 2709999900 HC NON-CHARGEABLE SUPPLY: Performed by: OBSTETRICS & GYNECOLOGY

## 2024-03-20 PROCEDURE — 7100000000 HC PACU RECOVERY - FIRST 15 MIN: Performed by: OBSTETRICS & GYNECOLOGY

## 2024-03-20 PROCEDURE — 6360000002 HC RX W HCPCS: Performed by: OBSTETRICS & GYNECOLOGY

## 2024-03-20 PROCEDURE — 3600000004 HC SURGERY LEVEL 4 BASE: Performed by: OBSTETRICS & GYNECOLOGY

## 2024-03-20 PROCEDURE — 7100000011 HC PHASE II RECOVERY - ADDTL 15 MIN: Performed by: OBSTETRICS & GYNECOLOGY

## 2024-03-20 RX ORDER — LIDOCAINE HYDROCHLORIDE 20 MG/ML
INJECTION, SOLUTION EPIDURAL; INFILTRATION; INTRACAUDAL; PERINEURAL PRN
Status: DISCONTINUED | OUTPATIENT
Start: 2024-03-20 | End: 2024-03-20 | Stop reason: SDUPTHER

## 2024-03-20 RX ORDER — SODIUM CHLORIDE 9 MG/ML
INJECTION, SOLUTION INTRAVENOUS PRN
Status: DISCONTINUED | OUTPATIENT
Start: 2024-03-20 | End: 2024-03-20 | Stop reason: HOSPADM

## 2024-03-20 RX ORDER — OXYCODONE HYDROCHLORIDE AND ACETAMINOPHEN 5; 325 MG/1; MG/1
1 TABLET ORAL EVERY 6 HOURS PRN
Qty: 10 TABLET | Refills: 0 | Status: SHIPPED | OUTPATIENT
Start: 2024-03-20 | End: 2024-03-25

## 2024-03-20 RX ORDER — CETIRIZINE HYDROCHLORIDE 5 MG/1
5 TABLET ORAL DAILY
COMMUNITY

## 2024-03-20 RX ORDER — OXYCODONE HYDROCHLORIDE 5 MG/1
5 TABLET ORAL
Status: DISCONTINUED | OUTPATIENT
Start: 2024-03-20 | End: 2024-03-20 | Stop reason: HOSPADM

## 2024-03-20 RX ORDER — ACETAMINOPHEN 500 MG
1000 TABLET ORAL ONCE
Status: COMPLETED | OUTPATIENT
Start: 2024-03-20 | End: 2024-03-20

## 2024-03-20 RX ORDER — GLYCOPYRROLATE 0.2 MG/ML
INJECTION INTRAMUSCULAR; INTRAVENOUS PRN
Status: DISCONTINUED | OUTPATIENT
Start: 2024-03-20 | End: 2024-03-20 | Stop reason: SDUPTHER

## 2024-03-20 RX ORDER — HYDROMORPHONE HYDROCHLORIDE 1 MG/ML
0.5 INJECTION, SOLUTION INTRAMUSCULAR; INTRAVENOUS; SUBCUTANEOUS EVERY 5 MIN PRN
Status: DISCONTINUED | OUTPATIENT
Start: 2024-03-20 | End: 2024-03-20 | Stop reason: HOSPADM

## 2024-03-20 RX ORDER — NEOSTIGMINE METHYLSULFATE 1 MG/ML
INJECTION, SOLUTION INTRAVENOUS PRN
Status: DISCONTINUED | OUTPATIENT
Start: 2024-03-20 | End: 2024-03-20 | Stop reason: SDUPTHER

## 2024-03-20 RX ORDER — DIPHENHYDRAMINE HYDROCHLORIDE 50 MG/ML
12.5 INJECTION INTRAMUSCULAR; INTRAVENOUS
Status: DISCONTINUED | OUTPATIENT
Start: 2024-03-20 | End: 2024-03-20 | Stop reason: HOSPADM

## 2024-03-20 RX ORDER — KETOROLAC TROMETHAMINE 30 MG/ML
INJECTION, SOLUTION INTRAMUSCULAR; INTRAVENOUS PRN
Status: DISCONTINUED | OUTPATIENT
Start: 2024-03-20 | End: 2024-03-20 | Stop reason: SDUPTHER

## 2024-03-20 RX ORDER — DROPERIDOL 2.5 MG/ML
0.62 INJECTION, SOLUTION INTRAMUSCULAR; INTRAVENOUS
Status: DISCONTINUED | OUTPATIENT
Start: 2024-03-20 | End: 2024-03-20 | Stop reason: HOSPADM

## 2024-03-20 RX ORDER — ONDANSETRON 2 MG/ML
INJECTION INTRAMUSCULAR; INTRAVENOUS PRN
Status: DISCONTINUED | OUTPATIENT
Start: 2024-03-20 | End: 2024-03-20 | Stop reason: SDUPTHER

## 2024-03-20 RX ORDER — KETOROLAC TROMETHAMINE 30 MG/ML
30 INJECTION, SOLUTION INTRAMUSCULAR; INTRAVENOUS
Status: DISCONTINUED | OUTPATIENT
Start: 2024-03-20 | End: 2024-03-20 | Stop reason: HOSPADM

## 2024-03-20 RX ORDER — ACETAMINOPHEN 500 MG
TABLET ORAL
Status: COMPLETED
Start: 2024-03-20 | End: 2024-03-20

## 2024-03-20 RX ORDER — LIDOCAINE HYDROCHLORIDE 10 MG/ML
1 INJECTION, SOLUTION EPIDURAL; INFILTRATION; INTRACAUDAL; PERINEURAL
Status: DISCONTINUED | OUTPATIENT
Start: 2024-03-20 | End: 2024-03-20 | Stop reason: HOSPADM

## 2024-03-20 RX ORDER — DEXAMETHASONE SODIUM PHOSPHATE 4 MG/ML
INJECTION, SOLUTION INTRA-ARTICULAR; INTRALESIONAL; INTRAMUSCULAR; INTRAVENOUS; SOFT TISSUE PRN
Status: DISCONTINUED | OUTPATIENT
Start: 2024-03-20 | End: 2024-03-20 | Stop reason: SDUPTHER

## 2024-03-20 RX ORDER — MIDAZOLAM HYDROCHLORIDE 1 MG/ML
INJECTION INTRAMUSCULAR; INTRAVENOUS PRN
Status: DISCONTINUED | OUTPATIENT
Start: 2024-03-20 | End: 2024-03-20 | Stop reason: SDUPTHER

## 2024-03-20 RX ORDER — FENTANYL CITRATE 50 UG/ML
25 INJECTION, SOLUTION INTRAMUSCULAR; INTRAVENOUS EVERY 5 MIN PRN
Status: DISCONTINUED | OUTPATIENT
Start: 2024-03-20 | End: 2024-03-20 | Stop reason: HOSPADM

## 2024-03-20 RX ORDER — SODIUM CHLORIDE, SODIUM LACTATE, POTASSIUM CHLORIDE, CALCIUM CHLORIDE 600; 310; 30; 20 MG/100ML; MG/100ML; MG/100ML; MG/100ML
INJECTION, SOLUTION INTRAVENOUS CONTINUOUS
Status: DISCONTINUED | OUTPATIENT
Start: 2024-03-20 | End: 2024-03-20 | Stop reason: HOSPADM

## 2024-03-20 RX ORDER — BUPIVACAINE HYDROCHLORIDE 2.5 MG/ML
INJECTION, SOLUTION EPIDURAL; INFILTRATION; INTRACAUDAL PRN
Status: DISCONTINUED | OUTPATIENT
Start: 2024-03-20 | End: 2024-03-20 | Stop reason: ALTCHOICE

## 2024-03-20 RX ORDER — ROCURONIUM BROMIDE 10 MG/ML
INJECTION, SOLUTION INTRAVENOUS PRN
Status: DISCONTINUED | OUTPATIENT
Start: 2024-03-20 | End: 2024-03-20 | Stop reason: SDUPTHER

## 2024-03-20 RX ORDER — SODIUM CHLORIDE 0.9 % (FLUSH) 0.9 %
5-40 SYRINGE (ML) INJECTION PRN
Status: DISCONTINUED | OUTPATIENT
Start: 2024-03-20 | End: 2024-03-20 | Stop reason: HOSPADM

## 2024-03-20 RX ORDER — METHYLENE BLUE 10 MG/ML
INJECTION INTRAVENOUS PRN
Status: DISCONTINUED | OUTPATIENT
Start: 2024-03-20 | End: 2024-03-20 | Stop reason: ALTCHOICE

## 2024-03-20 RX ORDER — MEPERIDINE HYDROCHLORIDE 25 MG/ML
12.5 INJECTION INTRAMUSCULAR; INTRAVENOUS; SUBCUTANEOUS EVERY 5 MIN PRN
Status: DISCONTINUED | OUTPATIENT
Start: 2024-03-20 | End: 2024-03-20 | Stop reason: HOSPADM

## 2024-03-20 RX ORDER — DOCUSATE SODIUM 100 MG/1
100 CAPSULE, LIQUID FILLED ORAL 2 TIMES DAILY
Qty: 60 CAPSULE | Refills: 0 | Status: SHIPPED | OUTPATIENT
Start: 2024-03-20 | End: 2024-04-19

## 2024-03-20 RX ORDER — HYDROMORPHONE HYDROCHLORIDE 2 MG/ML
INJECTION, SOLUTION INTRAMUSCULAR; INTRAVENOUS; SUBCUTANEOUS PRN
Status: DISCONTINUED | OUTPATIENT
Start: 2024-03-20 | End: 2024-03-20 | Stop reason: SDUPTHER

## 2024-03-20 RX ORDER — PROPOFOL 10 MG/ML
INJECTION, EMULSION INTRAVENOUS PRN
Status: DISCONTINUED | OUTPATIENT
Start: 2024-03-20 | End: 2024-03-20 | Stop reason: SDUPTHER

## 2024-03-20 RX ORDER — FENTANYL CITRATE 50 UG/ML
INJECTION, SOLUTION INTRAMUSCULAR; INTRAVENOUS PRN
Status: DISCONTINUED | OUTPATIENT
Start: 2024-03-20 | End: 2024-03-20 | Stop reason: SDUPTHER

## 2024-03-20 RX ORDER — NALOXONE HYDROCHLORIDE 0.4 MG/ML
INJECTION, SOLUTION INTRAMUSCULAR; INTRAVENOUS; SUBCUTANEOUS PRN
Status: DISCONTINUED | OUTPATIENT
Start: 2024-03-20 | End: 2024-03-20 | Stop reason: HOSPADM

## 2024-03-20 RX ORDER — IBUPROFEN 600 MG/1
600 TABLET ORAL 3 TIMES DAILY PRN
Qty: 30 TABLET | Refills: 0 | Status: SHIPPED | OUTPATIENT
Start: 2024-03-20

## 2024-03-20 RX ADMIN — HYDROMORPHONE HYDROCHLORIDE 1 MG: 2 INJECTION INTRAMUSCULAR; INTRAVENOUS; SUBCUTANEOUS at 10:41

## 2024-03-20 RX ADMIN — ONDANSETRON HYDROCHLORIDE 4 MG: 2 INJECTION, SOLUTION INTRAMUSCULAR; INTRAVENOUS at 11:07

## 2024-03-20 RX ADMIN — KETOROLAC TROMETHAMINE 30 MG: 30 INJECTION, SOLUTION INTRAMUSCULAR at 11:07

## 2024-03-20 RX ADMIN — FENTANYL CITRATE 100 MCG: 50 INJECTION, SOLUTION INTRAMUSCULAR; INTRAVENOUS at 10:19

## 2024-03-20 RX ADMIN — GLYCOPYRROLATE 0.6 MG: 0.2 INJECTION INTRAMUSCULAR; INTRAVENOUS at 11:07

## 2024-03-20 RX ADMIN — DEXAMETHASONE SODIUM PHOSPHATE 8 MG: 4 INJECTION, SOLUTION INTRA-ARTICULAR; INTRALESIONAL; INTRAMUSCULAR; INTRAVENOUS; SOFT TISSUE at 10:24

## 2024-03-20 RX ADMIN — Medication 1000 MG: at 09:47

## 2024-03-20 RX ADMIN — LIDOCAINE HYDROCHLORIDE 60 MG: 20 INJECTION, SOLUTION EPIDURAL; INFILTRATION; INTRACAUDAL; PERINEURAL at 10:19

## 2024-03-20 RX ADMIN — SODIUM CHLORIDE, POTASSIUM CHLORIDE, SODIUM LACTATE AND CALCIUM CHLORIDE: 600; 310; 30; 20 INJECTION, SOLUTION INTRAVENOUS at 09:46

## 2024-03-20 RX ADMIN — MIDAZOLAM HYDROCHLORIDE 2 MG: 1 INJECTION, SOLUTION INTRAMUSCULAR; INTRAVENOUS at 10:14

## 2024-03-20 RX ADMIN — PROPOFOL 50 MG: 10 INJECTION, EMULSION INTRAVENOUS at 10:21

## 2024-03-20 RX ADMIN — PROPOFOL 100 MCG/KG/MIN: 10 INJECTION, EMULSION INTRAVENOUS at 10:22

## 2024-03-20 RX ADMIN — PROPOFOL 150 MG: 10 INJECTION, EMULSION INTRAVENOUS at 10:20

## 2024-03-20 RX ADMIN — SODIUM CHLORIDE, POTASSIUM CHLORIDE, SODIUM LACTATE AND CALCIUM CHLORIDE: 600; 310; 30; 20 INJECTION, SOLUTION INTRAVENOUS at 11:43

## 2024-03-20 RX ADMIN — Medication 4 MG: at 11:07

## 2024-03-20 RX ADMIN — ROCURONIUM BROMIDE 40 MG: 10 INJECTION INTRAVENOUS at 10:19

## 2024-03-20 RX ADMIN — ACETAMINOPHEN 1000 MG: 500 TABLET ORAL at 09:47

## 2024-03-20 ASSESSMENT — PAIN - FUNCTIONAL ASSESSMENT: PAIN_FUNCTIONAL_ASSESSMENT: 0-10

## 2024-03-20 NOTE — OP NOTE
Gynecologic Operative Note    Patient ID:     Name: Jaclyn Hall    Medical Record Number: [id number]   YOB: 1995/24       Preoperative Diagnosis:  Pelvic pain, suspected endometriosis     Postoperative Diagnosis: Same    Surgeon: Naye Lizarraga MD    Assistant: Jo-Ann Sanders MD    Anesthesia: General    Procedure:   1. Diagnostic laparoscopy  2. Chromopertubation    Estimated Blood Loss: 10 mL    Pathology /Specimens: None    Complications: None    Findings:   1. Upper abdominal survey with some omental adhesions in RUQ, otherwise normal.  2. Normal uterus, cervix, bilateral fallopian tubes, bilateral ovaries  3. No evidence of endometriosis  4. Bilateral dye fill and spill on chromopertubation, with right side mildly sluggish    Signed By: Naye Lizarraga MD          Procedure in Detail:  The patient was taken to the operating room where she was placed in the supine position. After undergoing adequate general endotracheal anesthesia, she was placed up in the [place] laparoscopy stirrups in the dorsal lithotomy position. The patient was then prepped and draped in the usual fashion and malloy catheter was placed into the bladder.     Attention was first turned to the vagina where the speculum was placed in the vagina. The anterior lip of the cervix was grasped with a single-toothed tenaculum. [place] uterine manipulator was placed without difficulty. All other instruments were removed from the vagina and 's gloves were changed.     Attention was then turned to the abdomen. A vertical incision was made in the umbilicus. The abdomen was entered with a 5 mm port under direct visualization.  C02 pnuemo-peritoneum was created without difficulty. There was no evidence of bowel injury nor bleeding. 1 accessory port was placed in the right lower quadrant in the same fashion under direct visualization and without apparent injury. The above findings were noted.    Chromopertubation was

## 2024-03-20 NOTE — DISCHARGE INSTRUCTIONS
doctor has told you not to).  Ask your doctor for a different pain medicine.   Incision care    If you have strips of tape on the incision, leave the tape on for a week or until it falls off.     Wash the area daily with warm, soapy water and pat it dry. Don't use hydrogen peroxide or alcohol, which can slow healing. You may cover the area with a gauze bandage if it weeps or rubs against clothing. Change the bandage every day.   Follow-up care is a key part of your treatment and safety. Be sure to make and go to all appointments, and call your doctor if you are having problems. It's also a good idea to know your test results and keep a list of the medicines you take.  When should you call for help?   Call 911 anytime you think you may need emergency care. For example, call if:    You passed out (lost consciousness).     You are short of breath.   Call your doctor now or seek immediate medical care if:    You have pain that does not get better after you take pain medicine.     You have loose stitches, or your incision comes open.     Bright red blood has soaked through your bandage.     You have signs of infection, such as:  Increased pain, swelling, warmth, or redness.  Red streaks leading from the incision.  Pus draining from the incision.  A fever.     You are sick to your stomach or cannot keep fluids down.     You have signs of a blood clot in your leg (called a deep vein thrombosis), such as:  Pain in your calf, back of the knee, thigh, or groin.  Redness and swelling in your leg or groin.     You cannot pass stools or gas.   Watch closely for any changes in your health, and be sure to contact your doctor if you have any problems.  Where can you learn more?  Go to https://www.Gigya.net/patientEd and enter G657 to learn more about \"Laparoscopy: What to Expect at Home.\"  Current as of: July 26, 2023               Content Version: 14.0  © 2006-2024 [employer].   Care instructions adapted under

## 2024-03-20 NOTE — PERIOP NOTE
Permission received to review discharge instructions and discuss private health information with  and will have someone with them after discharge     Pt. Does not wish for blanket warmer to be connected at this time.

## 2024-03-20 NOTE — PERIOP NOTE
Jaclyn Hall  1995  [id number]    Situation:  Verbal report given from: RN and CRNA  Procedure: Procedure(s):  DIAGNOSTIC LAPAROSCOPY WITH CHROMOPERTUBATION    Background:    Preoperative diagnosis: Endometriosis of uterus [N80.00]  Pelvic pain in female [R10.2]    Postoperative diagnosis: * No post-op diagnosis entered *    :  Dr. Lizarraga    Assistant(s): Circulator: Cecile Garcia RN  Scrub Person First: Al Kemp    Specimens: * No specimens in log *    Assessment:  Intra-procedure medications         Anesthesia gave intra-procedure sedation and medications, see anesthesia flow sheet     Intravenous fluids: LR@ KVO     Vital signs stable. Denies chill or pain. Instructed to nap.       Recommendation:    Permission to share finding with  :  yes

## 2024-03-20 NOTE — H&P
Print      Patient    Name CHANDANA MOLINA (29yo, F) ID# [id number] Appt. Date/Time 2024 08:15AM    1995 [address] [facility] [facility]   Provider HA CRUZ MD   Insurance Med Primary: [facility] (PPO)  Insurance # : [id number]  Policy/Group # : [id number]  Employer Name : [employer]  Prescription: [employer] - Member is eligible. details   Chief Complaint  Consult  Patient's Care Team  OB/GYN: MINNIE PARKER MD (VIRTUAL VISITS AVAILABLE): [address] [address], Ph [phone], Fax [phone] NPI: [id number]   Patient's Pharmacies  [id number] (ERX): [address] [place] [address], Ph [phone], Fax [phone]   Vitals    Ht: 5 ft 6.25 in (168.28 cm) 2024 08:18 am Wt: 190 lbs (86.18 kg) 2024 08:19 am BMI: 30.4 2024 08:19 am   BP: 112/70 2024 08:22 am       Allergies    Reviewed Allergies   LATEX: Rash (Moderate severity) - Reaction: rash;Severity: Moderate; Comment: Entered By: Lyn VillatoroSigned By: Minnie Parker MDType: GPICode: [id number]: Y;    PENICILLIN G POT IN DEXTROSE: - Reaction: stinging feeling on skin;Severity: Moderate; Comment: Entered By: Saadia Freedman MA - Team 2 MECSigned By: Minnie Parker MDType: GPICode: [id number]: N;    Medications    Reviewed Medications    multivitamin 22   entered Harish Hall 4 mg (28) tablet  Take 1 tablet(s) every day by oral route for 30 days. 23   prescribed GUY Teague   Vaccines  Vaccines not reviewed (last reviewed 2023)  Vaccine Type Date Amt. Route Site NDC Lot # Mfr. Exp.  Date VIS VIS  Given Vaccinator   HPV   HPV, quadrivalent 14               Problems  Reviewed Problems  Endometriosis of pelvis - Onset: 2023  Pain in pelvis - Onset: 10/31/2017 - Entered By: Minnie Parker MDSigned By: Minnie Parker MD Description: Pelvic pain code:

## 2024-03-20 NOTE — PERIOP NOTE
Pt. Alert. Denies pain or chill. Discharge instructions reviewed with caregiver and patient. Allowed and answered questions. Tolerating PO fluids. Both state ready for discharge. 1243 Moved pt slowly-unable to void. Just feels pressure below.  notified if can not void in 8 hours to call MD and report to free standing Emergent care center. Both state understanding and know not to sit for more than a few minutes on toilet.

## 2024-03-20 NOTE — ANESTHESIA POSTPROCEDURE EVALUATION
Department of Anesthesiology  Postprocedure Note    Patient: Jaclyn Hall  MRN: [id number]  YOB: 1995  Date of evaluation: 3/20/2024    Procedure Summary       Date: 03/20/24 Room / Location: hospitals ASU B2 / hospitals AMBULATORY OR    Anesthesia Start: 1013 Anesthesia Stop: 1131    Procedure: DIAGNOSTIC LAPAROSCOPY WITH CHROMOPERTUBATION (Abdomen/Perineum) Diagnosis:       Endometriosis of uterus      Pelvic pain in female      (Endometriosis of uterus [N80.00])      (Pelvic pain in female [R10.2])    Surgeons: Naye Lizarraga MD Responsible Provider: Leia Thomas MD    Anesthesia Type: General ASA Status: 2            Anesthesia Type: General    Neal Phase I: Neal Score: 8    Neal Phase II:      Anesthesia Post Evaluation    Patient location during evaluation: PACU  Patient participation: complete - patient participated  Level of consciousness: awake and alert  Pain score: 0  Airway patency: patent  Nausea & Vomiting: no nausea and no vomiting  Cardiovascular status: hemodynamically stable  Respiratory status: acceptable  Hydration status: euvolemic  Multimodal analgesia pain management approach  Pain management: satisfactory to patient    No notable events documented.

## 2024-03-20 NOTE — ANESTHESIA PRE PROCEDURE
Department of Anesthesiology  Preprocedure Note       Name:  Jaclyn Hall   Age:  29 y.o.  :  1995                                          MRN:  [id number]         Date:  3/20/2024      Surgeon: Surgeon(s):  Naye Lizarraga MD McWhorter, Christina P, MD    Procedure: Procedure(s):  DIAGNOSTIC LAPAROSCOPY, EXCISION OF ENDOMETRIOSIS, IUD PLACEMENT    Medications prior to admission:   Prior to Admission medications    Medication Sig Start Date End Date Taking? Authorizing Provider   cetirizine (ZYRTEC) 5 MG tablet Take 1 tablet by mouth daily   Yes Provider, MD Chyna       Current medications:    Current Facility-Administered Medications   Medication Dose Route Frequency Provider Last Rate Last Admin   • lidocaine PF 1 % injection 1 mL  1 mL IntraDERmal Once PRN Leia Thoams MD       • lactated ringers IV soln infusion   IntraVENous Continuous Leia Thomas MD       • sodium chloride flush 0.9 % injection 5-40 mL  5-40 mL IntraVENous PRN Leia Thomas MD       • 0.9 % sodium chloride infusion   IntraVENous PRN Leia Thomas MD       • acetaminophen (TYLENOL) tablet 1,000 mg  1,000 mg Oral Once Leia Thomas MD       • acetaminophen (TYLENOL) 500 MG tablet                Allergies:    Allergies   Allergen Reactions   • Latex Rash   • Penicillins Hives and Other (See Comments)     'feels like bee stings\"   • Tetanus Toxoids Other (See Comments)     \"delerium\" fever       Problem List:    Patient Active Problem List   Diagnosis Code   • Polycystic ovary syndrome E28.2   • Irregular periods N92.6   • Female infertility associated with anovulation N97.0   • Scapulalgia M89.8X1       Past Medical History:        Diagnosis Date   • Anesthesia complication     father had very high temp/shocky after surgery was admitted overnight, no MH workup done, mother gets N/V after surgeries   • Asthma     allergy induced   • Gastrointestinal disorder     stress induced   • PCOS (polycystic

## 2024-10-29 LAB
C. TRACHOMATIS, EXTERNAL RESULT: NEGATIVE
HEP B, EXTERNAL RESULT: NON REACTIVE
HIV, EXTERNAL RESULT: NON REACTIVE
N. GONORRHOEAE, EXTERNAL RESULT: NEGATIVE

## 2024-10-30 LAB — RUBELLA TITER, EXTERNAL RESULT: NORMAL

## 2025-01-15 ENCOUNTER — HOSPITAL ENCOUNTER (EMERGENCY)
Facility: HOSPITAL | Age: 30
Discharge: HOME | End: 2025-01-15
Attending: EMERGENCY MEDICINE
Payer: COMMERCIAL

## 2025-01-15 ENCOUNTER — APPOINTMENT (OUTPATIENT)
Facility: HOSPITAL | Age: 30
End: 2025-01-15
Payer: COMMERCIAL

## 2025-01-15 VITALS
WEIGHT: 201.28 LBS | TEMPERATURE: 97.7 F | SYSTOLIC BLOOD PRESSURE: 126 MMHG | DIASTOLIC BLOOD PRESSURE: 77 MMHG | BODY MASS INDEX: 31.52 KG/M2 | RESPIRATION RATE: 18 BRPM | OXYGEN SATURATION: 100 % | HEART RATE: 97 BPM

## 2025-01-15 DIAGNOSIS — M79.89 LEG SWELLING: Primary | ICD-10-CM

## 2025-01-15 LAB
ALBUMIN SERPL-MCNC: 3.3 G/DL (ref 3.5–5)
ALBUMIN/GLOB SERPL: 0.8 (ref 1.1–2.2)
ALP SERPL-CCNC: 70 U/L (ref 45–117)
ALT SERPL-CCNC: 21 U/L (ref 12–78)
ANION GAP SERPL CALC-SCNC: 8 MMOL/L (ref 2–12)
APPEARANCE UR: CLEAR
AST SERPL-CCNC: 16 U/L (ref 15–37)
BACTERIA URNS QL MICRO: NEGATIVE /HPF
BASOPHILS # BLD: 0.03 K/UL (ref 0–0.1)
BASOPHILS NFR BLD: 0.3 % (ref 0–1)
BILIRUB SERPL-MCNC: 0.3 MG/DL (ref 0.2–1)
BILIRUB UR QL: NEGATIVE
BUN SERPL-MCNC: 9 MG/DL (ref 6–20)
BUN/CREAT SERPL: 16 (ref 12–20)
CALCIUM SERPL-MCNC: 9.5 MG/DL (ref 8.5–10.1)
CHLORIDE SERPL-SCNC: 104 MMOL/L (ref 97–108)
CO2 SERPL-SCNC: 24 MMOL/L (ref 21–32)
COLOR UR: ABNORMAL
COMMENT:: NORMAL
CREAT SERPL-MCNC: 0.57 MG/DL (ref 0.55–1.02)
DIFFERENTIAL METHOD BLD: ABNORMAL
EOSINOPHIL # BLD: 0.13 K/UL (ref 0–0.4)
EOSINOPHIL NFR BLD: 1.3 % (ref 0–7)
EPITH CASTS URNS QL MICRO: ABNORMAL /LPF
ERYTHROCYTE [DISTWIDTH] IN BLOOD BY AUTOMATED COUNT: 12.8 % (ref 11.5–14.5)
GLOBULIN SER CALC-MCNC: 4.3 G/DL (ref 2–4)
GLUCOSE SERPL-MCNC: 106 MG/DL (ref 65–100)
GLUCOSE UR STRIP.AUTO-MCNC: NEGATIVE MG/DL
HCT VFR BLD AUTO: 33.7 % (ref 35–47)
HGB BLD-MCNC: 11.8 G/DL (ref 11.5–16)
HGB UR QL STRIP: NEGATIVE
HYALINE CASTS URNS QL MICRO: ABNORMAL /LPF (ref 0–5)
IMM GRANULOCYTES # BLD AUTO: 0.08 K/UL (ref 0–0.04)
IMM GRANULOCYTES NFR BLD AUTO: 0.8 % (ref 0–0.5)
KETONES UR QL STRIP.AUTO: NEGATIVE MG/DL
LEUKOCYTE ESTERASE UR QL STRIP.AUTO: ABNORMAL
LYMPHOCYTES # BLD: 1.81 K/UL (ref 0.8–3.5)
LYMPHOCYTES NFR BLD: 18.2 % (ref 12–49)
MCH RBC QN AUTO: 32 PG (ref 26–34)
MCHC RBC AUTO-ENTMCNC: 35 G/DL (ref 30–36.5)
MCV RBC AUTO: 91.3 FL (ref 80–99)
MONOCYTES # BLD: 0.44 K/UL (ref 0–1)
MONOCYTES NFR BLD: 4.4 % (ref 5–13)
NEUTS SEG # BLD: 7.47 K/UL (ref 1.8–8)
NEUTS SEG NFR BLD: 75 % (ref 32–75)
NITRITE UR QL STRIP.AUTO: NEGATIVE
NRBC # BLD: 0 K/UL (ref 0–0.01)
NRBC BLD-RTO: 0 PER 100 WBC
PH UR STRIP: 6.5 (ref 5–8)
PLATELET # BLD AUTO: 201 K/UL (ref 150–400)
PMV BLD AUTO: 10.7 FL (ref 8.9–12.9)
POTASSIUM SERPL-SCNC: 3 MMOL/L (ref 3.5–5.1)
PROT SERPL-MCNC: 7.6 G/DL (ref 6.4–8.2)
PROT UR STRIP-MCNC: NEGATIVE MG/DL
RBC # BLD AUTO: 3.69 M/UL (ref 3.8–5.2)
RBC #/AREA URNS HPF: ABNORMAL /HPF (ref 0–5)
SODIUM SERPL-SCNC: 136 MMOL/L (ref 136–145)
SP GR UR REFRACTOMETRY: <1.005 (ref 1–1.03)
SPECIMEN HOLD: NORMAL
SPECIMEN HOLD: NORMAL
UROBILINOGEN UR QL STRIP.AUTO: 0.2 EU/DL (ref 0.2–1)
WBC # BLD AUTO: 10 K/UL (ref 3.6–11)
WBC URNS QL MICRO: ABNORMAL /HPF (ref 0–4)

## 2025-01-15 PROCEDURE — 80053 COMPREHEN METABOLIC PANEL: CPT

## 2025-01-15 PROCEDURE — 6370000000 HC RX 637 (ALT 250 FOR IP)

## 2025-01-15 PROCEDURE — 99283 EMERGENCY DEPT VISIT LOW MDM: CPT

## 2025-01-15 PROCEDURE — 85025 COMPLETE CBC W/AUTO DIFF WBC: CPT

## 2025-01-15 PROCEDURE — 81001 URINALYSIS AUTO W/SCOPE: CPT

## 2025-01-15 PROCEDURE — 93971 EXTREMITY STUDY: CPT

## 2025-01-15 PROCEDURE — 36415 COLL VENOUS BLD VENIPUNCTURE: CPT

## 2025-01-15 RX ORDER — POTASSIUM CHLORIDE 750 MG/1
40 TABLET, EXTENDED RELEASE ORAL ONCE
Status: COMPLETED | OUTPATIENT
Start: 2025-01-15 | End: 2025-01-15

## 2025-01-15 RX ADMIN — POTASSIUM CHLORIDE 40 MEQ: 750 TABLET, EXTENDED RELEASE ORAL at 19:54

## 2025-01-15 ASSESSMENT — PAIN DESCRIPTION - FREQUENCY: FREQUENCY: CONTINUOUS

## 2025-01-15 ASSESSMENT — PAIN DESCRIPTION - ORIENTATION: ORIENTATION: LEFT

## 2025-01-15 ASSESSMENT — PAIN DESCRIPTION - DESCRIPTORS: DESCRIPTORS: PINS AND NEEDLES

## 2025-01-15 ASSESSMENT — PAIN SCALES - GENERAL: PAINLEVEL_OUTOF10: 5

## 2025-01-15 ASSESSMENT — PAIN DESCRIPTION - ONSET: ONSET: ON-GOING

## 2025-01-15 ASSESSMENT — PAIN DESCRIPTION - LOCATION: LOCATION: ANKLE

## 2025-01-15 ASSESSMENT — PAIN - FUNCTIONAL ASSESSMENT
PAIN_FUNCTIONAL_ASSESSMENT: 0-10
PAIN_FUNCTIONAL_ASSESSMENT: ACTIVITIES ARE NOT PREVENTED

## 2025-01-15 ASSESSMENT — PAIN DESCRIPTION - PAIN TYPE: TYPE: ACUTE PAIN

## 2025-01-15 NOTE — ED TRIAGE NOTES
Pt comes in from home with CC of left foot and ankle swelling. Pt reports she is 22 weeks pregnant.

## 2025-01-15 NOTE — ED PROVIDER NOTES
[facility] EMERGENCY DEPARTMENT  EMERGENCY DEPARTMENT ENCOUNTER      Pt Name: Jaclyn Hall  MRN: [id number]  Birthdate 1995  Date of evaluation: 1/15/2025  Provider: Hazel Turner PA-C    CHIEF COMPLAINT       Chief Complaint   Patient presents with    Leg Swelling    Ankle Pain         HISTORY OF PRESENT ILLNESS   (Location/Symptom, Timing/Onset, Context/Setting, Quality, Duration, Modifying Factors, Severity)  Note limiting factors.   Jaclyn Hall is a 29 y.o. female with history of  has a past medical history of Anesthesia complication, Asthma, Gastrointestinal disorder, and PCOS (polycystic ovarian syndrome). who presents from home to [facility] ED with cc of left lower extremity swelling x 2 days.  Reports the swelling is around the ankle area.  She does note an area of pruritus to the posterior aspect of the ankle.  No erythema or warmth.  She called her OB/GYN who instructed her to come to the emergency department due to concern for blood clot.  Reports some tingling in the ankle area.  She is currently 22 weeks pregnant.  She has been taking her blood pressures at home which have been normal.  She notes some anxiety.  She has no other concerns.  No history of blood clots.  No recent long travel or surgeries.  No injury or trauma to the ankle.  Denies chest pain or shortness of breath.        Dr. Menezes [facility]    PCP: No primary care provider on file.    There are no other complaints, changes or physical findings at this time.                Review of External Medical Records:     Nursing Notes were reviewed.    REVIEW OF SYSTEMS    (2-9 systems for level 4, 10 or more for level 5)     Review of Systems   Cardiovascular:  Positive for leg swelling.       Except as noted above the remainder of the review of systems was reviewed and negative.       PAST MEDICAL HISTORY     Past Medical History:   Diagnosis Date    Anesthesia complication     father had very high temp/shocky after surgery

## 2025-01-16 NOTE — DISCHARGE INSTRUCTIONS
You were seen today in the emergency department.  You had a reassuring workup with an ultrasound that did not show evidence of a blood clots.  Your blood work was also reassuring.  You did have mildly low potassium which was repleted in the emergency department.  You can incorporate electrolyte drinks into your diet to help with this.  Follow-up closely with your OB/GYN.  Return to the emergency department for new or worsening symptoms.

## 2025-04-26 LAB — GBS, EXTERNAL RESULT: NEGATIVE

## 2025-05-15 ENCOUNTER — HOSPITAL ENCOUNTER (INPATIENT)
Facility: HOSPITAL | Age: 30
LOS: 4 days | Discharge: HOME | End: 2025-05-19
Attending: OBSTETRICS & GYNECOLOGY | Admitting: STUDENT IN AN ORGANIZED HEALTH CARE EDUCATION/TRAINING PROGRAM
Payer: COMMERCIAL

## 2025-05-15 PROBLEM — O16.3 ELEVATED BLOOD PRESSURE COMPLICATING PREGNANCY, ANTEPARTUM, THIRD TRIMESTER: Status: ACTIVE | Noted: 2025-05-15

## 2025-05-15 PROBLEM — Z3A.39 39 WEEKS GESTATION OF PREGNANCY: Status: ACTIVE | Noted: 2025-05-15

## 2025-05-15 LAB
ABO + RH BLD: NORMAL
ALBUMIN SERPL-MCNC: 2.8 G/DL (ref 3.5–5)
ALBUMIN/GLOB SERPL: 0.7 (ref 1.1–2.2)
ALP SERPL-CCNC: 173 U/L (ref 45–117)
ALT SERPL-CCNC: 20 U/L (ref 12–78)
ANION GAP SERPL CALC-SCNC: 9 MMOL/L (ref 2–12)
AST SERPL-CCNC: 16 U/L (ref 15–37)
BASOPHILS # BLD: 0.05 K/UL (ref 0–0.1)
BASOPHILS NFR BLD: 0.5 % (ref 0–1)
BILIRUB SERPL-MCNC: 0.4 MG/DL (ref 0.2–1)
BLOOD GROUP ANTIBODIES SERPL: NORMAL
BUN SERPL-MCNC: 8 MG/DL (ref 6–20)
BUN/CREAT SERPL: 15 (ref 12–20)
CALCIUM SERPL-MCNC: 10 MG/DL (ref 8.5–10.1)
CHLORIDE SERPL-SCNC: 106 MMOL/L (ref 97–108)
CO2 SERPL-SCNC: 22 MMOL/L (ref 21–32)
CREAT SERPL-MCNC: 0.53 MG/DL (ref 0.55–1.02)
CREAT UR-MCNC: 19.9 MG/DL
DIFFERENTIAL METHOD BLD: ABNORMAL
EOSINOPHIL # BLD: 0.08 K/UL (ref 0–0.4)
EOSINOPHIL NFR BLD: 0.8 % (ref 0–7)
ERYTHROCYTE [DISTWIDTH] IN BLOOD BY AUTOMATED COUNT: 12.8 % (ref 11.5–14.5)
GLOBULIN SER CALC-MCNC: 3.9 G/DL (ref 2–4)
GLUCOSE SERPL-MCNC: 66 MG/DL (ref 65–100)
HCT VFR BLD AUTO: 38.3 % (ref 35–47)
HGB BLD-MCNC: 13.4 G/DL (ref 11.5–16)
IMM GRANULOCYTES # BLD AUTO: 0.09 K/UL (ref 0–0.04)
IMM GRANULOCYTES NFR BLD AUTO: 0.9 % (ref 0–0.5)
LYMPHOCYTES # BLD: 1.58 K/UL (ref 0.8–3.5)
LYMPHOCYTES NFR BLD: 15.5 % (ref 12–49)
MCH RBC QN AUTO: 30.6 PG (ref 26–34)
MCHC RBC AUTO-ENTMCNC: 35 G/DL (ref 30–36.5)
MCV RBC AUTO: 87.4 FL (ref 80–99)
MONOCYTES # BLD: 0.84 K/UL (ref 0–1)
MONOCYTES NFR BLD: 8.2 % (ref 5–13)
NEUTS SEG # BLD: 7.58 K/UL (ref 1.8–8)
NEUTS SEG NFR BLD: 74.1 % (ref 32–75)
NRBC # BLD: 0 K/UL (ref 0–0.01)
NRBC BLD-RTO: 0 PER 100 WBC
PLATELET # BLD AUTO: 153 K/UL (ref 150–400)
PMV BLD AUTO: 12.5 FL (ref 8.9–12.9)
POTASSIUM SERPL-SCNC: 3.9 MMOL/L (ref 3.5–5.1)
PROT SERPL-MCNC: 6.7 G/DL (ref 6.4–8.2)
PROT UR-MCNC: 8 MG/DL (ref 0–11.9)
PROT/CREAT UR-RTO: 0.4
RBC # BLD AUTO: 4.38 M/UL (ref 3.8–5.2)
SODIUM SERPL-SCNC: 137 MMOL/L (ref 136–145)
SPECIMEN EXP DATE BLD: NORMAL
WBC # BLD AUTO: 10.2 K/UL (ref 3.6–11)

## 2025-05-15 PROCEDURE — 59200 INSERT CERVICAL DILATOR: CPT

## 2025-05-15 PROCEDURE — 7100000001 HC PACU RECOVERY - ADDTL 15 MIN

## 2025-05-15 PROCEDURE — 1100000000 HC RM PRIVATE

## 2025-05-15 PROCEDURE — 82570 ASSAY OF URINE CREATININE: CPT

## 2025-05-15 PROCEDURE — 6360000002 HC RX W HCPCS: Performed by: ADVANCED PRACTICE MIDWIFE

## 2025-05-15 PROCEDURE — 86901 BLOOD TYPING SEROLOGIC RH(D): CPT

## 2025-05-15 PROCEDURE — 0U7C7DJ DILATION OF CERVIX WITH INTRALUM DEV, TEMP, VIA OPENING: ICD-10-PCS | Performed by: STUDENT IN AN ORGANIZED HEALTH CARE EDUCATION/TRAINING PROGRAM

## 2025-05-15 PROCEDURE — 85025 COMPLETE CBC W/AUTO DIFF WBC: CPT

## 2025-05-15 PROCEDURE — 7220000101 HC DELIVERY VAGINAL/SINGLE

## 2025-05-15 PROCEDURE — 7100000000 HC PACU RECOVERY - FIRST 15 MIN

## 2025-05-15 PROCEDURE — 6370000000 HC RX 637 (ALT 250 FOR IP): Performed by: STUDENT IN AN ORGANIZED HEALTH CARE EDUCATION/TRAINING PROGRAM

## 2025-05-15 PROCEDURE — C1726 CATH, BAL DIL, NON-VASCULAR: HCPCS

## 2025-05-15 PROCEDURE — 2500000003 HC RX 250 WO HCPCS: Performed by: STUDENT IN AN ORGANIZED HEALTH CARE EDUCATION/TRAINING PROGRAM

## 2025-05-15 PROCEDURE — 80053 COMPREHEN METABOLIC PANEL: CPT

## 2025-05-15 PROCEDURE — 86900 BLOOD TYPING SEROLOGIC ABO: CPT

## 2025-05-15 PROCEDURE — 7210000100 HC LABOR FEE PER 1 HR

## 2025-05-15 PROCEDURE — 86850 RBC ANTIBODY SCREEN: CPT

## 2025-05-15 PROCEDURE — 84156 ASSAY OF PROTEIN URINE: CPT

## 2025-05-15 RX ORDER — TERBUTALINE SULFATE 1 MG/ML
0.25 INJECTION SUBCUTANEOUS
Status: DISCONTINUED | OUTPATIENT
Start: 2025-05-15 | End: 2025-05-19 | Stop reason: HOSPADM

## 2025-05-15 RX ORDER — SODIUM CHLORIDE 0.9 % (FLUSH) 0.9 %
5-40 SYRINGE (ML) INJECTION EVERY 12 HOURS SCHEDULED
Status: DISCONTINUED | OUTPATIENT
Start: 2025-05-15 | End: 2025-05-19 | Stop reason: HOSPADM

## 2025-05-15 RX ORDER — SODIUM CHLORIDE, SODIUM LACTATE, POTASSIUM CHLORIDE, AND CALCIUM CHLORIDE .6; .31; .03; .02 G/100ML; G/100ML; G/100ML; G/100ML
500 INJECTION, SOLUTION INTRAVENOUS PRN
Status: DISCONTINUED | OUTPATIENT
Start: 2025-05-15 | End: 2025-05-19 | Stop reason: HOSPADM

## 2025-05-15 RX ORDER — MISOPROSTOL 200 UG/1
400 TABLET ORAL PRN
OUTPATIENT
Start: 2025-05-15

## 2025-05-15 RX ORDER — ASPIRIN 81 MG/1
81 TABLET, CHEWABLE ORAL DAILY
Status: ON HOLD | COMMUNITY
End: 2025-05-19 | Stop reason: HOSPADM

## 2025-05-15 RX ORDER — CARBOPROST TROMETHAMINE 250 UG/ML
250 INJECTION, SOLUTION INTRAMUSCULAR PRN
OUTPATIENT
Start: 2025-05-15

## 2025-05-15 RX ORDER — SODIUM CHLORIDE 9 MG/ML
25 INJECTION, SOLUTION INTRAVENOUS PRN
Status: DISCONTINUED | OUTPATIENT
Start: 2025-05-15 | End: 2025-05-19 | Stop reason: HOSPADM

## 2025-05-15 RX ORDER — METHYLERGONOVINE MALEATE 0.2 MG/ML
200 INJECTION INTRAVENOUS PRN
OUTPATIENT
Start: 2025-05-15

## 2025-05-15 RX ORDER — NALBUPHINE HYDROCHLORIDE 10 MG/ML
10 INJECTION INTRAMUSCULAR; INTRAVENOUS; SUBCUTANEOUS
Status: DISCONTINUED | OUTPATIENT
Start: 2025-05-15 | End: 2025-05-19 | Stop reason: HOSPADM

## 2025-05-15 RX ORDER — SODIUM CHLORIDE, SODIUM LACTATE, POTASSIUM CHLORIDE, CALCIUM CHLORIDE 600; 310; 30; 20 MG/100ML; MG/100ML; MG/100ML; MG/100ML
INJECTION, SOLUTION INTRAVENOUS CONTINUOUS
Status: DISCONTINUED | OUTPATIENT
Start: 2025-05-15 | End: 2025-05-19 | Stop reason: HOSPADM

## 2025-05-15 RX ORDER — SODIUM CHLORIDE 0.9 % (FLUSH) 0.9 %
5-40 SYRINGE (ML) INJECTION PRN
Status: DISCONTINUED | OUTPATIENT
Start: 2025-05-15 | End: 2025-05-19 | Stop reason: HOSPADM

## 2025-05-15 RX ORDER — ACETAMINOPHEN 325 MG/1
650 TABLET ORAL EVERY 4 HOURS PRN
Status: DISCONTINUED | OUTPATIENT
Start: 2025-05-15 | End: 2025-05-19 | Stop reason: HOSPADM

## 2025-05-15 RX ORDER — ONDANSETRON 4 MG/1
4 TABLET, ORALLY DISINTEGRATING ORAL EVERY 6 HOURS PRN
Status: DISCONTINUED | OUTPATIENT
Start: 2025-05-15 | End: 2025-05-16 | Stop reason: SDUPTHER

## 2025-05-15 RX ORDER — ONDANSETRON 2 MG/ML
4 INJECTION INTRAMUSCULAR; INTRAVENOUS EVERY 6 HOURS PRN
Status: DISCONTINUED | OUTPATIENT
Start: 2025-05-15 | End: 2025-05-16 | Stop reason: SDUPTHER

## 2025-05-15 RX ADMIN — SODIUM CHLORIDE, PRESERVATIVE FREE 10 ML: 5 INJECTION INTRAVENOUS at 21:51

## 2025-05-15 RX ADMIN — Medication 25 MCG: at 15:39

## 2025-05-15 RX ADMIN — NALBUPHINE HYDROCHLORIDE 10 MG: 10 INJECTION INTRAMUSCULAR; INTRAVENOUS; SUBCUTANEOUS at 21:52

## 2025-05-15 RX ADMIN — Medication 25 MCG: at 23:37

## 2025-05-15 ASSESSMENT — PAIN DESCRIPTION - DESCRIPTORS: DESCRIPTORS: ACHING;PRESSURE;CRAMPING

## 2025-05-15 ASSESSMENT — PAIN SCALES - GENERAL: PAINLEVEL_OUTOF10: 6

## 2025-05-15 NOTE — PROGRESS NOTES
1530 Bedside and Verbal shift change report given to TRINH Lennon RN (oncoming nurse) by GHASSAN Garcia RN (offgoing nurse). Report included the following information Nurse Handoff Report, Intake/Output, MAR, and Recent Results.       1955 Bedside and Verbal shift change report given to TRINH García RN (oncoming nurse) by TRINH Lennon RN (offgoing nurse). Report included the following information Nurse Handoff Report, Intake/Output, MAR, and Recent Results.

## 2025-05-15 NOTE — PROGRESS NOTES
5/15/2025 12:16 PM  Received to [facility] 10 for PIH workup.    1240 IV placed right hand- labs drawn and sent . PCR obtained and sent    1315 Dr Rodriguez at bedside- plan of care discussed- pt and  agree to induction of labor. Vscan- VTX    1410 Dr Rodriguez at bedside-     1420 ripening balloon placed- 60/60    1530 Bedside SBAR report to ESA Lennon RN

## 2025-05-15 NOTE — H&P
History & Physical    Name: Jaclyn Hall MRN: [id number]  SSN: [id number]    YOB: 1995  Age: 30 y.o.  Sex: female      Subjective:     Chief Complaint:  Pregnancy and elev BP    HPI:  Patient presents to L&D from office for elevated BP. Was seen in office for routine OB visit and /100 and 158/120.    She is doing well. Reports BP tends to be high in office. Has been checking it at home as well and usually 120s/70s.  Reports good FM. Denies regular contractions, VB, LOF. Also denies HA, vision changes, CP, SOB, RUQ pain, new/worsening swelling.    Preg complications  -PCOS - nml glucola  -IUI w/ SGF - NIPT neg, AFP neg  -Mild asthma    Estimated Date of Delivery: 25  OB History    Para Term  AB Living   3    2    SAB IAB Ectopic Molar Multiple Live Births   2           # Outcome Date GA Lbr Bhavin/2nd Weight Sex Type Anes PTL Lv   3 Current            2 SAB      SAB   FD   1 SAB      SAB   FD       Past Medical History:   Diagnosis Date    Anesthesia complication     father had very high temp/shocky after surgery was admitted overnight, no MH workup done, mother gets N/V after surgeries    Gastrointestinal disorder     stress induced    Hypertension     Infertility, female     IUI and previous IVF    PCOS (polycystic ovarian syndrome)     Scoliosis     \"double s curve\"-     Past Surgical History:   Procedure Laterality Date    LAPAROSCOPY N/A 3/20/2024    DIAGNOSTIC LAPAROSCOPY WITH CHROMOPERTUBATION performed by Naye Lizarraga MD at [facility] OR    MULTIPLE TOOTH EXTRACTIONS      12 year molars     Social History     Occupational History    Not on file   Tobacco Use    Smoking status: Never     Passive exposure: Never    Smokeless tobacco: Never   Vaping Use    Vaping status: Never Used   Substance and Sexual Activity    Alcohol use: Yes     Comment: maybe a drink a month    Drug use: No    Sexual activity: Not on file     Family History   Problem Relation Age of Onset

## 2025-05-16 ENCOUNTER — ANESTHESIA EVENT (OUTPATIENT)
Facility: HOSPITAL | Age: 30
End: 2025-05-16
Payer: COMMERCIAL

## 2025-05-16 ENCOUNTER — ANESTHESIA (OUTPATIENT)
Facility: HOSPITAL | Age: 30
End: 2025-05-16
Payer: COMMERCIAL

## 2025-05-16 PROCEDURE — 51702 INSERT TEMP BLADDER CATH: CPT

## 2025-05-16 PROCEDURE — 1120000000 HC RM PRIVATE OB

## 2025-05-16 PROCEDURE — 7210000100 HC LABOR FEE PER 1 HR

## 2025-05-16 PROCEDURE — 10907ZC DRAINAGE OF AMNIOTIC FLUID, THERAPEUTIC FROM PRODUCTS OF CONCEPTION, VIA NATURAL OR ARTIFICIAL OPENING: ICD-10-PCS | Performed by: STUDENT IN AN ORGANIZED HEALTH CARE EDUCATION/TRAINING PROGRAM

## 2025-05-16 PROCEDURE — 3700000025 EPIDURAL BLOCK: Performed by: ANESTHESIOLOGY

## 2025-05-16 PROCEDURE — 6370000000 HC RX 637 (ALT 250 FOR IP): Performed by: STUDENT IN AN ORGANIZED HEALTH CARE EDUCATION/TRAINING PROGRAM

## 2025-05-16 PROCEDURE — 10H073Z INSERTION OF MONITORING ELECTRODE INTO PRODUCTS OF CONCEPTION, VIA NATURAL OR ARTIFICIAL OPENING: ICD-10-PCS | Performed by: STUDENT IN AN ORGANIZED HEALTH CARE EDUCATION/TRAINING PROGRAM

## 2025-05-16 PROCEDURE — 6360000002 HC RX W HCPCS: Performed by: ANESTHESIOLOGY

## 2025-05-16 PROCEDURE — 3E0P7VZ INTRODUCTION OF HORMONE INTO FEMALE REPRODUCTIVE, VIA NATURAL OR ARTIFICIAL OPENING: ICD-10-PCS | Performed by: STUDENT IN AN ORGANIZED HEALTH CARE EDUCATION/TRAINING PROGRAM

## 2025-05-16 PROCEDURE — 2500000003 HC RX 250 WO HCPCS: Performed by: ANESTHESIOLOGY

## 2025-05-16 PROCEDURE — 2500000003 HC RX 250 WO HCPCS: Performed by: STUDENT IN AN ORGANIZED HEALTH CARE EDUCATION/TRAINING PROGRAM

## 2025-05-16 PROCEDURE — 51701 INSERT BLADDER CATHETER: CPT

## 2025-05-16 PROCEDURE — 00HU33Z INSERTION OF INFUSION DEVICE INTO SPINAL CANAL, PERCUTANEOUS APPROACH: ICD-10-PCS | Performed by: ANESTHESIOLOGY

## 2025-05-16 PROCEDURE — 6360000002 HC RX W HCPCS: Performed by: STUDENT IN AN ORGANIZED HEALTH CARE EDUCATION/TRAINING PROGRAM

## 2025-05-16 PROCEDURE — 2580000003 HC RX 258: Performed by: STUDENT IN AN ORGANIZED HEALTH CARE EDUCATION/TRAINING PROGRAM

## 2025-05-16 PROCEDURE — 6360000002 HC RX W HCPCS: Performed by: ADVANCED PRACTICE MIDWIFE

## 2025-05-16 PROCEDURE — 7100000000 HC PACU RECOVERY - FIRST 15 MIN

## 2025-05-16 PROCEDURE — 7100000001 HC PACU RECOVERY - ADDTL 15 MIN

## 2025-05-16 PROCEDURE — 88307 TISSUE EXAM BY PATHOLOGIST: CPT

## 2025-05-16 PROCEDURE — 7220000101 HC DELIVERY VAGINAL/SINGLE

## 2025-05-16 PROCEDURE — 4A1H74Z MONITORING OF PRODUCTS OF CONCEPTION, CARDIAC ELECTRICAL ACTIVITY, VIA NATURAL OR ARTIFICIAL OPENING: ICD-10-PCS | Performed by: STUDENT IN AN ORGANIZED HEALTH CARE EDUCATION/TRAINING PROGRAM

## 2025-05-16 PROCEDURE — 3E033VJ INTRODUCTION OF OTHER HORMONE INTO PERIPHERAL VEIN, PERCUTANEOUS APPROACH: ICD-10-PCS | Performed by: STUDENT IN AN ORGANIZED HEALTH CARE EDUCATION/TRAINING PROGRAM

## 2025-05-16 PROCEDURE — 2580000003 HC RX 258: Performed by: ANESTHESIOLOGY

## 2025-05-16 PROCEDURE — 6360000002 HC RX W HCPCS

## 2025-05-16 PROCEDURE — 0UQGXZZ REPAIR VAGINA, EXTERNAL APPROACH: ICD-10-PCS | Performed by: STUDENT IN AN ORGANIZED HEALTH CARE EDUCATION/TRAINING PROGRAM

## 2025-05-16 RX ORDER — NALBUPHINE HYDROCHLORIDE 10 MG/ML
5 INJECTION INTRAMUSCULAR; INTRAVENOUS; SUBCUTANEOUS EVERY 4 HOURS PRN
Status: DISCONTINUED | OUTPATIENT
Start: 2025-05-16 | End: 2025-05-19 | Stop reason: HOSPADM

## 2025-05-16 RX ORDER — NALOXONE HYDROCHLORIDE 0.4 MG/ML
INJECTION, SOLUTION INTRAMUSCULAR; INTRAVENOUS; SUBCUTANEOUS PRN
Status: DISCONTINUED | OUTPATIENT
Start: 2025-05-16 | End: 2025-05-19 | Stop reason: HOSPADM

## 2025-05-16 RX ORDER — DIPHENHYDRAMINE HYDROCHLORIDE 50 MG/ML
50 INJECTION, SOLUTION INTRAMUSCULAR; INTRAVENOUS
Status: COMPLETED | OUTPATIENT
Start: 2025-05-16 | End: 2025-05-16

## 2025-05-16 RX ORDER — IBUPROFEN 400 MG/1
800 TABLET, FILM COATED ORAL EVERY 8 HOURS SCHEDULED
Status: DISCONTINUED | OUTPATIENT
Start: 2025-05-16 | End: 2025-05-19 | Stop reason: HOSPADM

## 2025-05-16 RX ORDER — FENTANYL CITRATE 50 UG/ML
INJECTION, SOLUTION INTRAMUSCULAR; INTRAVENOUS
Status: DISCONTINUED | OUTPATIENT
Start: 2025-05-16 | End: 2025-05-16 | Stop reason: SDUPTHER

## 2025-05-16 RX ORDER — SODIUM CHLORIDE 0.9 % (FLUSH) 0.9 %
5-40 SYRINGE (ML) INJECTION EVERY 12 HOURS SCHEDULED
Status: DISCONTINUED | OUTPATIENT
Start: 2025-05-16 | End: 2025-05-19 | Stop reason: HOSPADM

## 2025-05-16 RX ORDER — MODIFIED LANOLIN
OINTMENT (GRAM) TOPICAL PRN
Status: DISCONTINUED | OUTPATIENT
Start: 2025-05-16 | End: 2025-05-19 | Stop reason: HOSPADM

## 2025-05-16 RX ORDER — FENTANYL/BUPIVACAINE/NS/PF 2-1250MCG
1-15 PLASTIC BAG, INJECTION (ML) INJECTION CONTINUOUS
Refills: 0 | Status: DISCONTINUED | OUTPATIENT
Start: 2025-05-16 | End: 2025-05-19 | Stop reason: HOSPADM

## 2025-05-16 RX ORDER — ACETAMINOPHEN 500 MG
1000 TABLET ORAL EVERY 8 HOURS SCHEDULED
Status: DISCONTINUED | OUTPATIENT
Start: 2025-05-16 | End: 2025-05-19 | Stop reason: HOSPADM

## 2025-05-16 RX ORDER — FENTANYL CITRATE 50 UG/ML
INJECTION, SOLUTION INTRAMUSCULAR; INTRAVENOUS
Status: COMPLETED
Start: 2025-05-16 | End: 2025-05-16

## 2025-05-16 RX ORDER — ONDANSETRON 4 MG/1
4 TABLET, ORALLY DISINTEGRATING ORAL EVERY 6 HOURS PRN
Status: DISCONTINUED | OUTPATIENT
Start: 2025-05-16 | End: 2025-05-19 | Stop reason: HOSPADM

## 2025-05-16 RX ORDER — BUPIVACAINE HYDROCHLORIDE 2.5 MG/ML
INJECTION, SOLUTION EPIDURAL; INFILTRATION; INTRACAUDAL; PERINEURAL
Status: COMPLETED
Start: 2025-05-16 | End: 2025-05-16

## 2025-05-16 RX ORDER — SODIUM CHLORIDE 9 MG/ML
INJECTION, SOLUTION INTRAVENOUS PRN
Status: DISCONTINUED | OUTPATIENT
Start: 2025-05-16 | End: 2025-05-19 | Stop reason: HOSPADM

## 2025-05-16 RX ORDER — EPHEDRINE SULFATE 50 MG/ML
10 INJECTION INTRAVENOUS
Status: COMPLETED | OUTPATIENT
Start: 2025-05-16 | End: 2025-05-16

## 2025-05-16 RX ORDER — CETIRIZINE HYDROCHLORIDE 10 MG/1
5 TABLET ORAL DAILY
Status: DISCONTINUED | OUTPATIENT
Start: 2025-05-16 | End: 2025-05-19 | Stop reason: HOSPADM

## 2025-05-16 RX ORDER — BUPIVACAINE HYDROCHLORIDE 2.5 MG/ML
INJECTION, SOLUTION EPIDURAL; INFILTRATION; INTRACAUDAL; PERINEURAL
Status: DISCONTINUED | OUTPATIENT
Start: 2025-05-16 | End: 2025-05-16 | Stop reason: SDUPTHER

## 2025-05-16 RX ORDER — DIPHENHYDRAMINE HYDROCHLORIDE 50 MG/ML
INJECTION, SOLUTION INTRAMUSCULAR; INTRAVENOUS
Status: COMPLETED
Start: 2025-05-16 | End: 2025-05-16

## 2025-05-16 RX ORDER — ONDANSETRON 2 MG/ML
4 INJECTION INTRAMUSCULAR; INTRAVENOUS EVERY 6 HOURS PRN
Status: DISCONTINUED | OUTPATIENT
Start: 2025-05-16 | End: 2025-05-19 | Stop reason: HOSPADM

## 2025-05-16 RX ORDER — SODIUM CHLORIDE 0.9 % (FLUSH) 0.9 %
5-40 SYRINGE (ML) INJECTION PRN
Status: DISCONTINUED | OUTPATIENT
Start: 2025-05-16 | End: 2025-05-19 | Stop reason: HOSPADM

## 2025-05-16 RX ORDER — DOCUSATE SODIUM 100 MG/1
100 CAPSULE, LIQUID FILLED ORAL 2 TIMES DAILY
Status: DISCONTINUED | OUTPATIENT
Start: 2025-05-16 | End: 2025-05-19 | Stop reason: HOSPADM

## 2025-05-16 RX ORDER — ONDANSETRON 2 MG/ML
4 INJECTION INTRAMUSCULAR; INTRAVENOUS EVERY 6 HOURS PRN
Status: DISCONTINUED | OUTPATIENT
Start: 2025-05-16 | End: 2025-05-16 | Stop reason: SDUPTHER

## 2025-05-16 RX ADMIN — OXYTOCIN 1 MILLI-UNITS/MIN: 10 INJECTION INTRAVENOUS at 05:29

## 2025-05-16 RX ADMIN — ACETAMINOPHEN 1000 MG: 500 TABLET ORAL at 21:49

## 2025-05-16 RX ADMIN — DIPHENHYDRAMINE HYDROCHLORIDE 50 MG: 50 INJECTION INTRAMUSCULAR; INTRAVENOUS at 13:49

## 2025-05-16 RX ADMIN — Medication 166.7 ML: at 15:13

## 2025-05-16 RX ADMIN — SODIUM CHLORIDE, SODIUM LACTATE, POTASSIUM CHLORIDE, AND CALCIUM CHLORIDE: .6; .31; .03; .02 INJECTION, SOLUTION INTRAVENOUS at 05:22

## 2025-05-16 RX ADMIN — IBUPROFEN 800 MG: 400 TABLET, FILM COATED ORAL at 17:08

## 2025-05-16 RX ADMIN — BUPIVACAINE HYDROCHLORIDE 10 ML/HR: 5 INJECTION, SOLUTION EPIDURAL; INTRACAUDAL; PERINEURAL at 09:31

## 2025-05-16 RX ADMIN — OXYTOCIN 87.3 MILLI-UNITS/MIN: 10 INJECTION INTRAVENOUS at 15:24

## 2025-05-16 RX ADMIN — SODIUM CHLORIDE, SODIUM LACTATE, POTASSIUM CHLORIDE, AND CALCIUM CHLORIDE: .6; .31; .03; .02 INJECTION, SOLUTION INTRAVENOUS at 14:53

## 2025-05-16 RX ADMIN — SODIUM CHLORIDE, SODIUM LACTATE, POTASSIUM CHLORIDE, AND CALCIUM CHLORIDE: .6; .31; .03; .02 INJECTION, SOLUTION INTRAVENOUS at 08:55

## 2025-05-16 RX ADMIN — NALBUPHINE HYDROCHLORIDE 10 MG: 10 INJECTION INTRAMUSCULAR; INTRAVENOUS; SUBCUTANEOUS at 01:57

## 2025-05-16 RX ADMIN — EPHEDRINE SULFATE 10 MG: 50 INJECTION INTRAVENOUS at 14:14

## 2025-05-16 RX ADMIN — BUPIVACAINE HYDROCHLORIDE 10 ML: 2.5 INJECTION, SOLUTION EPIDURAL; INFILTRATION; INTRACAUDAL; PERINEURAL at 09:13

## 2025-05-16 RX ADMIN — DIPHENHYDRAMINE HYDROCHLORIDE 50 MG: 50 INJECTION, SOLUTION INTRAMUSCULAR; INTRAVENOUS at 13:49

## 2025-05-16 RX ADMIN — FENTANYL CITRATE 100 MCG: 50 INJECTION INTRAMUSCULAR; INTRAVENOUS at 09:13

## 2025-05-16 RX ADMIN — SODIUM CHLORIDE, PRESERVATIVE FREE 10 ML: 5 INJECTION INTRAVENOUS at 01:57

## 2025-05-16 RX ADMIN — SODIUM CHLORIDE, PRESERVATIVE FREE 10 ML: 5 INJECTION INTRAVENOUS at 05:20

## 2025-05-16 ASSESSMENT — PAIN DESCRIPTION - DESCRIPTORS: DESCRIPTORS: ACHING

## 2025-05-16 ASSESSMENT — PAIN DESCRIPTION - LOCATION
LOCATION: BACK
LOCATION: ABDOMEN
LOCATION: PERINEUM

## 2025-05-16 ASSESSMENT — PAIN SCALES - GENERAL
PAINLEVEL_OUTOF10: 4
PAINLEVEL_OUTOF10: 3

## 2025-05-16 NOTE — LACTATION NOTE
This note was copied from a baby's chart.  Infant born vaginally this afternoon to a  mom at 39 1/7 weeks gestation. Mom noted breast changes during the pregnancy. She has a history of infertility and PCOS. This pregnancy is a result of IUI. Infant is SGA and is rachel +.  Assisted mom with latching infant in the laid back football position. She would latch but not stay on the nipple initially. Fed for a short time using the shield, then switched to direct feeding and maintained the latch for the duration of 20 mins on the right breast. We then switched to the left breast and infant latched well.     Feeding Plan:  Mother will keep baby skin to skin as often as possible, feed on demand, 8-12x/day , respond to feeding cues, obtain latch, listen for audible swallowing, be aware of signs of oxytocin release/ cramping,thirst,sleepiness while breastfeeding, offer both breasts,and will not limit feedings.  Mother agrees to utilize breast massage while nursing to facilitate lactogenesis.

## 2025-05-16 NOTE — PROGRESS NOTES
[facility] Labor Progress Note     Patient: Jaclyn Hall MRN: [id number]  SSN: [id number]    YOB: 1995  Age: 30 y.o.  Sex: female      Care assumed at     Subjective:   Patient uncomfortable with cook catheter, would like pain medication.    Objective:   Blood pressure (!) 141/84, pulse 68, temperature 98.4 °F (36.9 °C), temperature source Oral, resp. rate 16, height 1.702 m (5' 7\"), weight 93.4 kg (206 lb), SpO2 96%.  Fetal heart baseline 135, moderate variability, positive accelerations, negative decelerations, Uterine contractions irregular, mild to palpation, resting tone soft, Sterile Vaginal Exam: deferred, fetal presentation vertex, membranes intact     Cook catheter in place: 60 ml/60 ml    Assessment:     39w0d  Category 1 fetal heart rate tracing   IOL for Pre E without SF  IUI pregnancy  Hx asthma  O Positive  GBS Neg    Plan:   Nubain for pain control.  Continue with POC as outlined by Dr Rodriguez.  Cook/cytotec overnight.  Maternal and fetal monitoring per protocol.    JORGE Meraz CNM

## 2025-05-16 NOTE — PROGRESS NOTES
Progress Note    Late note due to the acuity of patients.     S: patient comfortable after her epidural this morning but continuing to feel rectal pressure.     O:  Vitals:    25 1125 25 1130 25 1159 25 1232   BP: (!) 113/59 118/65 (!) 119/59 (!) 143/66   Pulse: 56 69 60 80   Resp:   16 16   Temp:   98.2 °F (36.8 °C)    TempSrc:   Oral    SpO2:    94%   Weight:       Height:         General: NAD, resting comfortably in bed  Cards: well perfused, non cyanotic   Pulm: non labored breathing  Abd: gravid, soft resting tone  SVE: 7/90/-2, AROM to clear fluid    FHT: 130 bpm, +accels, after AROM recurrent variable then late decelerations, moderate variability throughout. IUPC and FSE placed, variability was maintained, pitocin discontinued and patient repositioned.   [place]: 2-3 contractions in a 10 min period, palpate moderate  Category 2 FHT    A/P:  31 yo  @39w1d IOL for preeclampsia without severe features (P:C 0.4). Patient induced with cytotec, malloy balloon, pitocin now AROM. Category 2 FHT due to recurrent variable then late decelerations after AROM. Patient continues to make cervical change and fetal variability is moderate indicating fetal reserve. Long discussion with the patient and her partner about implications of persistent late or variable decelerations including fetal cord compression, placental insufficiency or fetal intolerance of labor for other causes. Since variability is maintained, will continue with induction be keep pitocin off until category 1 FHT for at least 20-30min. Discussed that should this pattern persist without any cervical change, would need to discuss a  delivery for fetal intolerance of labor.   Occasional variables seen, could also consider an amnioinfusion.     IOL  - continuous monitoring  - GBS negative  - frequent repositioning and use of peanut ball     Preeclampsia without severe features  - BP remain mild range  - continue to trend  - any

## 2025-05-16 NOTE — ANESTHESIA PRE PROCEDURE
Department of Anesthesiology  Preprocedure Note       Name:  Jaclyn Hall   Age:  30 y.o.  :  1995                                          MRN:  [id number]         Date:  2025      Surgeon: * No surgeons listed *    Procedure: * No procedures listed *    Medications prior to admission:   Prior to Admission medications    Medication Sig Start Date End Date Taking? Authorizing Provider   Prenatal MV-Min-Fe Fum-FA-DHA (PRENATAL 1 PO) Take by mouth   Yes Provider, Chyna, MD   aspirin 81 MG chewable tablet Take 1 tablet by mouth daily   Yes Provider, Historical, MD   cetirizine (ZYRTEC) 5 MG tablet Take 1 tablet by mouth daily   Yes Provider, Chyna, MD   ibuprofen (ADVIL;MOTRIN) 600 MG tablet Take 1 tablet by mouth 3 times daily as needed for Pain 3/20/24   Naye Lizarraga MD       Current medications:    Current Facility-Administered Medications   Medication Dose Route Frequency Provider Last Rate Last Admin    fentaNYL 2 mcg/mL BUPivacaine 0.125% in sodium chloride 0.9% 100 mL epidural infusion  1-15 mL/hr Epidural Continuous Marc Shelley MD        naloxone 0.4 mg in 10 mL sodium chloride syringe   IntraVENous PRN Marc Shelley MD        ePHEDrine injection 10 mg  10 mg IntraVENous Once PRN Marc Shelley MD        nalbuphine (NUBAIN) injection 5 mg  5 mg IntraVENous Q4H PRN Marc Shelley MD        ondansetron (ZOFRAN) injection 4 mg  4 mg IntraVENous Q6H PRN Marc Shelley MD        terbutaline (BRETHINE) injection 0.25 mg  0.25 mg SubCUTAneous Once PRN Laura Rodriguez DO        lactated ringers infusion   IntraVENous Continuous Laura Rodriguez,  mL/hr at 25 0522 New Bag at 25 0522    lactated ringers bolus 500 mL  500 mL IntraVENous PRN Laura Rodriguez, DO        Or    lactated ringers bolus 500 mL  500 mL IntraVENous PRN Laura Rodriguez, DO        sodium chloride flush 0.9 % injection 5-40 mL

## 2025-05-16 NOTE — ANESTHESIA POSTPROCEDURE EVALUATION
Post-Anesthesia Evaluation and Assessment    Patient: Jaclyn Hall MRN: [id number]  SSN: [id number]    YOB: 1995  Age: 30 y.o.  Sex: female      Patient is status post Labor Epidural.     Cardiovascular Function/Vital Signs  Visit Vitals  BP (!) 110/58   Pulse 76   Temp 98.2 °F (36.8 °C) (Oral)   Resp 16   Ht 1.702 m (5' 7\")   Wt 93.4 kg (206 lb)   SpO2 100%   BMI 32.26 kg/m²       Complications related to anesthesia: Leaving epidural in place for 24 hrs for possible blood patch. Please call [phone] for removal.    Post-anesthesia assessment completed. No concerns    Signed By: Marc Shelley MD     May 16, 2025

## 2025-05-16 NOTE — PROGRESS NOTES
1955-Bedside and Verbal shift change report given to TRINH García RN  (oncoming nurse) by TRINH Lennon RN  (offgoing nurse). Report included the following information Nurse Handoff Report, Index, Intake/Output, MAR, and Recent Results.    Client in bed states that her hips and lower back hurts. Cytotec held at this time due to contraction pattern.   2152-IV pain medication given per cllient request. Client does express relief from pain medication.   2230-Client resting quietly no distress noted at this time   2337-Cytotec given   0145-Client states that she is starting to feel the pain again in her back. Client asked of she could have something to help her sleep before her pitocin was started. Offered client more IV pain medication and client said yes.   0157-IV pain medication given  0530-Pitocin started client does states that she is feeling her contractions mainly in her back but she is coping well. Encouraged client to explore knee chest position to help alleviate discomfort   0708-Client up to bathroom  0719-Novii monitor applied by DANIEL Suarez RN   0750-Bedside and Verbal shift change report given to D. Seaver RN  (oncoming nurse) by TRINH García RN  (offgoing nurse). Report included the following information Nurse Handoff Report, Index, Intake/Output, MAR, Recent Results, and Event Log.

## 2025-05-16 NOTE — DISCHARGE INSTRUCTIONS
Postpartum Support Groups   We know that all of us are dealing with a tremendous amount of uncertainty, confusion and disruption to our daily lives, which may result in increased anxiety, depression and fear. If you are feeling unsettled or worse, please know that we are here to help. During this time of increased caution and care for one another, [facility] [place] ([facility]) is offering virtual and in person support groups to ALL MOTHERS in [place] regardless of the age of your child/children as a way to help weather this emotional storm together. Social support is an important part of self-care during this time of physical distancing.  Virtual postpartum support group meetings available at www.postpartumva.org  Warm Line: 865.449.1163    Breastfeeding Support Groups   1st and 3rd Wednesday of each month at [facility]  2nd and 4th Tuesday of each month at [facility] (in education center behind cafeteria)    [email or web address]    POST DELIVERY DISCHARGE INSTRUCTIONS    Name: Jaclyn Hall  YOB: 1995  Primary Diagnosis: [unfilled]    General:     Diet/Diet Restrictions:  Eight 8-ounce glasses of fluid daily (water, juices); avoid excessive caffeine intake.  Meals/snacks as desired which are high in fiber and carbohydrates and low in fat and cholesterol.    Medications:   {Medication reconciliation information is now added to the patient's AVS automatically when it is printed.  There is no need to use this SmartLink in discharge instructions.  Highlight this text and delete it to clear this message}      Physical Activity / Restrictions / Safety:     Avoid heavy lifting, no more that 8 lbs. For 2-3 weeks;   Limit use of stairs to 2 times daily for the first week home.   No driving for one week.  Avoid intercourse 4-6 weeks, no douching or tampon use.   Check with obstetrician before starting or resuming an exercise

## 2025-05-16 NOTE — PROGRESS NOTES
0750 Bedside and Verbal shift change report received from TRINH García RN. Report included the following information Nurse Handoff Report, Intake/Output, MAR, and Recent Results.   0800 Patient denies headache, visual disturbance, epigastric pain, nausea/vomiting. Complains of lower back and abdominal cramping with contractions, states she is struggling to cope  0805 Dr. Iverson notified of patient's discomfort, good with plan to proceed with epidural  0813 IVF bolus started  0820 Up to void in bathroom  0830 Very uncomfortable with contractions, oxytocin off  0835 Dr. Shelley notified, to sitting position for epidural  0857 Dr. Garay at bedside  0858 Intraprocedural time out done  0900 Epidural started  0908 Epidural placed  Novii monitor with intermittent tracing, external ultrasound held in place  0919 Repositioned to left side, Novii monitor still with intermittent tracing, external fetal monitor reapplied  0945 Comfortable with contractions  0953 Repositioned to right side  1025 Comfortable with contractions, oxytocin restarted  1055 Straight cath for 500 ml clear yellow urine  1142 Dr. Iverson at bedside, FHR tracing reviewed, SVE 7/90/-2, AROM moderate amount clear fluid  1144 Returned to left side  1320 Complains of increased rectal pressure with contractions, SVE 7-8/-1, edematous, epidural PCEA bolus given  1325 Isaac cath inserted  1327 Continues to complain of rectal discomfort with contractions.Epidural PCEA bolus given  1330 Dr. Iverson called and updated on SVE 7-8/-1,edematous  1349 Benadryl 50 mg IV given  1405 Dr. Iverson called and  notified of variable and late decelerations, bedside  evaluate requested  1408 Dr. Iverson at bedside, FHR tracing reviewed  1414 Ephedrine 10 mg IV given   1415 SVE by Dr. Iverson, 9/+1,   1455 Complains of increased rectal pressure, SVE 10/100/+2  1500 Dr. Iverson notified 10/100/+2, will begin pushing by DALILA Torres, RN, Isaac cath removed   1502 Begins pushing with

## 2025-05-16 NOTE — ANESTHESIA PROCEDURE NOTES
Epidural Block    Patient location during procedure: OB  Start time: 5/16/2025 9:27 AM  Reason for block: labor epidural  Staffing  Performed: anesthesiologist   Anesthesiologist: Marc Shelley MD  Performed by: Marc Shelley MD  Authorized by: Marc Shelley MD    Epidural  Patient position: sitting  Prep: DuraPrep  Patient monitoring: cardiac monitor, continuous pulse ox and frequent blood pressure checks  Approach: midline and left paramedian (scoliosis)  Location: L3-4  Injection technique: LOREE saline  Provider prep: mask and sterile gloves  Needle  Needle type: Tuohy   Needle gauge: 17 G  Needle length: 3.5 in  Needle insertion depth: 7 cm  Catheter type: end hole  Catheter size: 18 G  Catheter at skin depth: 12 cmCatheter Secured: tegaderm and tape  Assessment  Sensory level: T6  Events: cerebrospinal fluid and None  Hemodynamics: stable  Attempts: 2  Outcomes: uncomplicated and patient tolerated procedure well  Additional Notes  First attempt L4/5 with good loss of resistance and no CSF, catheter difficult to advance with pop and paresthesia, +CSF from catheter. Removed and replaced L3/4 with no issues no CSF. Instructions given to the patient if develops headache.  Preanesthetic Checklist  Completed: patient identified, IV checked, site marked, risks and benefits discussed, surgical/procedural consents, equipment checked, pre-op evaluation, timeout performed, anesthesia consent given, oxygen available, monitors applied/VS acknowledged and fire risk safety assessment completed and verbalized

## 2025-05-16 NOTE — L&D DELIVERY NOTE
Ankit Hall [[id number]]      Labor Events     Labor: No   Steroids: None  Cervical Ripening Date/Time:      Cervical Ripening Type: Isaac/EASI, Misoprostol  Rupture Identifier: Sac 1  Rupture Date/Time:  25 11:42:00   Rupture Type: AROM  Fluid Color: Clear  Fluid Odor: None  Fluid Volume: Moderate  Induction: Cervical Ripening Balloon, Isaac Bulb (Balloon), Oxytocin  Labor Complications: Pre-eclampsia       Anesthesia    Method: Epidural       Labor Event Times      Labor onset date/time:        Dilation complete date/time:  25 14:55:00     Start pushing date/time:  2025 15:02:00   Decision date/time (emergent ):            Delivery Details      Delivery Date: 25 Delivery Time: 15:08:00   Delivery Type: Vaginal, Spontaneous              [place] Presentation    Presentation: Vertex       Shoulder Dystocia    Shoulder Dystocia Present?: No       Assisted Delivery Details    Forceps Attempted?: No  Vacuum Extractor Attempted?: No                           Cord    Vessels: 3 Vessels  Complications: Nuchal Loose  Cord Around: Head, Left Lower Extremity  Delayed Cord Clamping?: Yes  Cord Clamped Date/Time: 2025 15:12:33  Cord Blood Disposition: Lab  Gases Sent?: No              Placenta    Date/Time: 2025 15:13:30  Removal: Expressed  Appearance: Intact  Disposition: Pathology       Lacerations    Episiotomy: None  Perineal Lacerations: None  Other Lacerations: vaginal laceration  Vaginal Laceration?: Yes Repaired?: Yes   Number of Repair Packets: 1       Vaginal Counts    Initial Count Personnel: DANY CARR  Initial Count Verified By: DJSEAVER,RNC  Intial Sponge Count: Correct Intial Needles Count: Correct Intial Instruments Count: Correct          Blood Loss  Mother: Jaclyn Hall #[id number]     Start of Mother's Information      Delivery Blood Loss   Intrapartum & Postpartum: 25 0308 - 25 1532    Delivery Admission: 05/15/25 1216 -  Patients daughter Sophia Junior calling to ask about the OV patient had with [facility]. I read her the office notes. She will speak with her mom and call back.

## 2025-05-17 PROCEDURE — 2500000003 HC RX 250 WO HCPCS: Performed by: STUDENT IN AN ORGANIZED HEALTH CARE EDUCATION/TRAINING PROGRAM

## 2025-05-17 PROCEDURE — 6370000000 HC RX 637 (ALT 250 FOR IP): Performed by: STUDENT IN AN ORGANIZED HEALTH CARE EDUCATION/TRAINING PROGRAM

## 2025-05-17 PROCEDURE — 94760 N-INVAS EAR/PLS OXIMETRY 1: CPT

## 2025-05-17 PROCEDURE — 1120000000 HC RM PRIVATE OB

## 2025-05-17 RX ADMIN — IBUPROFEN 800 MG: 400 TABLET, FILM COATED ORAL at 02:30

## 2025-05-17 RX ADMIN — DOCUSATE SODIUM 100 MG: 100 CAPSULE, LIQUID FILLED ORAL at 09:11

## 2025-05-17 RX ADMIN — ACETAMINOPHEN 1000 MG: 500 TABLET ORAL at 23:06

## 2025-05-17 RX ADMIN — SODIUM CHLORIDE, PRESERVATIVE FREE 10 ML: 5 INJECTION INTRAVENOUS at 09:13

## 2025-05-17 RX ADMIN — ACETAMINOPHEN 1000 MG: 500 TABLET ORAL at 13:49

## 2025-05-17 RX ADMIN — IBUPROFEN 800 MG: 400 TABLET, FILM COATED ORAL at 09:11

## 2025-05-17 RX ADMIN — IBUPROFEN 800 MG: 400 TABLET, FILM COATED ORAL at 17:31

## 2025-05-17 RX ADMIN — ACETAMINOPHEN 1000 MG: 500 TABLET ORAL at 06:01

## 2025-05-17 ASSESSMENT — PAIN SCALES - GENERAL: PAINLEVEL_OUTOF10: 2

## 2025-05-17 ASSESSMENT — PAIN DESCRIPTION - LOCATION: LOCATION: ABDOMEN

## 2025-05-17 NOTE — PROGRESS NOTES
Post-Partum Day Number 1 Progress Note    Jaclyn Hall     Assessment: Doing well, post partum day 1 s/p TSVD    Plan:  Preeclampsia without severe features  - BP normotensive after delivery  - continue to monitor BP  - will keep until  for BP monitoring    Postpartum Care  - Continue routine postpartum and perineal care as well as maternal education.  - breastfeeding on demand, lactation support  - Plan discharge home tomorrow.    Information for the patient's :  Ankit Hall [[id number]]   Vaginal, Spontaneous   Subjective: Patient doing well without significant complaint.  Voiding without difficulty, normal lochia.    Vitals:  /85   Pulse 73   Temp 97.5 °F (36.4 °C) (Oral)   Resp 16   Ht 1.702 m (5' 7\")   Wt 93.4 kg (206 lb)   SpO2 99%   Breastfeeding Unknown   BMI 32.26 kg/m²   Temp (24hrs), Av °F (36.7 °C), Min:97.5 °F (36.4 °C), Max:98.5 °F (36.9 °C)        Exam:   Patient without distress.                  Fundus firm, nontender per nursing fundal checks.                Perineum with normal lochia noted per nursing assessment.                Lower extremities are negative for pathological edema.    Labs:     Lab Results   Component Value Date/Time    WBC 10.2 05/15/2025 12:34 PM    WBC 10.0 01/15/2025 05:47 PM    WBC 7.0 2023 11:42 AM    HGB 13.4 05/15/2025 12:34 PM    HGB 11.8 01/15/2025 05:47 PM    HGB 14.3 2023 11:42 AM    HCT 38.3 05/15/2025 12:34 PM    HCT 33.7 01/15/2025 05:47 PM    HCT 44.2 2023 11:42 AM     05/15/2025 12:34 PM     01/15/2025 05:47 PM     2023 11:42 AM       No results found for this or any previous visit (from the past 24 hours).

## 2025-05-17 NOTE — PROGRESS NOTES
0800: Bedside and Verbal shift change report given to MERVIN (oncoming nurse) by SP (offgoing nurse). Report included the following information Nurse Handoff Report.      1627: Pt reported that she registered Moms Under Pressure. BP monitor #338 given.

## 2025-05-17 NOTE — LACTATION NOTE
This note was copied from a baby's chart.  1100 - mom states she has been struggling to get baby to latch to the breast. She has a shield at the bedside but said that wasn't working well for her. Baby has been supplementing with some formula. Dad said baby has not been taking the bottle well.     I helped mom with a feeding this morning. Moms nipples are thick and baby was not able to get them deep enough into her mouth. We used a shield and baby latched and was sucking with some formula on the nipple. She nursed for a few minutes and then we took the shield off and baby latched directly to the breast for another couple minutes. Baby latched directly to the left breast in the prone position. Dad was bottle feeding the formula after nursing with the dr taylor bottle. Baby was not taking the bottle well.  I gave dad a nuk nipple and showed him how to try to bottle feed in the side lying position. Baby took a total of 8 cc's     I talked to mom about breast feeding an SGA baby. We decided not to put the baby to the breast at each feeding. We will begin with attempting to breast feed every other feeding but may need to change that so that baby attempts at breast every 3rd or 4th feeding. Dad will bottle feed formula at each feeding. I will get mom pumping for stimulation and any breast milk collected will be given to the baby.

## 2025-05-17 NOTE — PROGRESS NOTES
1930\" Bedside and Verbal shift change report given to A Hi RN (oncoming nurse) by Lalita RN (offgoing nurse). Report included the following information Nurse Handoff Report, Intake/Output, and MAR.

## 2025-05-18 PROCEDURE — 6370000000 HC RX 637 (ALT 250 FOR IP): Performed by: STUDENT IN AN ORGANIZED HEALTH CARE EDUCATION/TRAINING PROGRAM

## 2025-05-18 PROCEDURE — 1120000000 HC RM PRIVATE OB

## 2025-05-18 PROCEDURE — 94760 N-INVAS EAR/PLS OXIMETRY 1: CPT

## 2025-05-18 RX ORDER — NIFEDIPINE 30 MG/1
60 TABLET, EXTENDED RELEASE ORAL DAILY
Status: DISCONTINUED | OUTPATIENT
Start: 2025-05-19 | End: 2025-05-19 | Stop reason: HOSPADM

## 2025-05-18 RX ORDER — NIFEDIPINE 30 MG/1
30 TABLET, EXTENDED RELEASE ORAL ONCE
Status: COMPLETED | OUTPATIENT
Start: 2025-05-18 | End: 2025-05-18

## 2025-05-18 RX ORDER — NIFEDIPINE 30 MG/1
30 TABLET, EXTENDED RELEASE ORAL DAILY
Status: DISCONTINUED | OUTPATIENT
Start: 2025-05-18 | End: 2025-05-18

## 2025-05-18 RX ADMIN — ACETAMINOPHEN 1000 MG: 500 TABLET ORAL at 06:57

## 2025-05-18 RX ADMIN — NIFEDIPINE 30 MG: 30 TABLET, FILM COATED, EXTENDED RELEASE ORAL at 15:43

## 2025-05-18 RX ADMIN — DOCUSATE SODIUM 100 MG: 100 CAPSULE, LIQUID FILLED ORAL at 21:05

## 2025-05-18 RX ADMIN — NIFEDIPINE 30 MG: 30 TABLET, FILM COATED, EXTENDED RELEASE ORAL at 10:39

## 2025-05-18 RX ADMIN — IBUPROFEN 800 MG: 400 TABLET, FILM COATED ORAL at 05:15

## 2025-05-18 RX ADMIN — ACETAMINOPHEN 1000 MG: 500 TABLET ORAL at 15:42

## 2025-05-18 RX ADMIN — CETIRIZINE HYDROCHLORIDE 5 MG: 10 TABLET, FILM COATED ORAL at 09:13

## 2025-05-18 RX ADMIN — DOCUSATE SODIUM 100 MG: 100 CAPSULE, LIQUID FILLED ORAL at 09:13

## 2025-05-18 RX ADMIN — IBUPROFEN 800 MG: 400 TABLET, FILM COATED ORAL at 21:05

## 2025-05-18 RX ADMIN — IBUPROFEN 800 MG: 400 TABLET, FILM COATED ORAL at 13:10

## 2025-05-18 ASSESSMENT — PAIN SCALES - GENERAL
PAINLEVEL_OUTOF10: 3
PAINLEVEL_OUTOF10: 0
PAINLEVEL_OUTOF10: 3

## 2025-05-18 ASSESSMENT — PAIN DESCRIPTION - LOCATION: LOCATION: HEAD

## 2025-05-18 NOTE — PROGRESS NOTES
Post-Partum Day Number 2 Progress Note    Jaclyn Hall     Assessment: Doing well s/p TSVD, post partum day 2    Plan:  Preeclampsia without severe features  - BP have become mild range overnight  - start procardia 30mg XL now   - if BP remain normotensive through 1800 tonight, can discharge home  - if BP remain mild range, will keep for 24 hour monitoring  - continue to monitor BP at home with parameters     Postpartum Care   - Discharge home today pending BP  - Pain medication prescription(s) sent.  - Questions answered.    Dispo: Follow up in office in 3-5 days for a BP check and 6 week(s) with [facility] [place].    Information for the patient's :  Rafael, Girl Jaclyn [[id number]]   Vaginal, Spontaneous   Subjective: Patient doing well without significant complaint.  Voiding without difficulty, normal lochia. Ready for discharge home.    Vitals:  BP (!) 136/91   Pulse 69   Temp 97.5 °F (36.4 °C) (Oral)   Resp 16   Ht 1.702 m (5' 7\")   Wt 93.4 kg (206 lb)   SpO2 98%   Breastfeeding Unknown   BMI 32.26 kg/m²   Temp (24hrs), Av.6 °F (36.4 °C), Min:97.5 °F (36.4 °C), Max:97.8 °F (36.6 °C)      Exam:        Patient without distress.                Fundus firm, nontender per nursing fundal checks                Perineum with normal lochia noted per nursing assessment                Lower extremities are negative for pathological edema    Labs:     Lab Results   Component Value Date/Time    WBC 10.2 05/15/2025 12:34 PM    WBC 10.0 01/15/2025 05:47 PM    WBC 7.0 2023 11:42 AM    HGB 13.4 05/15/2025 12:34 PM    HGB 11.8 01/15/2025 05:47 PM    HGB 14.3 2023 11:42 AM    HCT 38.3 05/15/2025 12:34 PM    HCT 33.7 01/15/2025 05:47 PM    HCT 44.2 2023 11:42 AM     05/15/2025 12:34 PM     01/15/2025 05:47 PM     2023 11:42 AM       No results found for this or any previous visit (from the past 24 hours).

## 2025-05-18 NOTE — PROGRESS NOTES
Bedside and Verbal shift change report given to DANYEL Pratt RN (oncoming nurse) by TRINH Do RN (offgoing nurse). Report included the following information Nurse Handoff Report, Intake/Output, MAR, and Recent Results.

## 2025-05-18 NOTE — PROGRESS NOTES
1930: Bedside and Verbal shift change report given to A Hi RN  (oncoming nurse) by S So RN (offgoing nurse). Report included the following information Nurse Handoff Report, Intake/Output, and MAR.

## 2025-05-18 NOTE — LACTATION NOTE
This note was copied from a baby's chart.  Mom states she has been bottle feeding formula to the baby. She said she tried pumping but she noticed some blood and then didn't try again. Mom is not sure she wants to put the baby to the breast or continue to pump. She bottle fed her other child and is comfortable with that. The breast pump is in the room and she knows how to use it if she decides to begin pumping again.

## 2025-05-19 VITALS
HEART RATE: 75 BPM | SYSTOLIC BLOOD PRESSURE: 132 MMHG | WEIGHT: 206 LBS | OXYGEN SATURATION: 100 % | DIASTOLIC BLOOD PRESSURE: 93 MMHG | BODY MASS INDEX: 32.33 KG/M2 | TEMPERATURE: 97.5 F | RESPIRATION RATE: 16 BRPM | HEIGHT: 67 IN

## 2025-05-19 PROCEDURE — 6370000000 HC RX 637 (ALT 250 FOR IP): Performed by: STUDENT IN AN ORGANIZED HEALTH CARE EDUCATION/TRAINING PROGRAM

## 2025-05-19 RX ORDER — NIFEDIPINE 30 MG/1
60 TABLET, EXTENDED RELEASE ORAL DAILY
Qty: 30 TABLET | Refills: 3 | Status: SHIPPED | OUTPATIENT
Start: 2025-05-20

## 2025-05-19 RX ORDER — IBUPROFEN 800 MG/1
800 TABLET, FILM COATED ORAL EVERY 8 HOURS SCHEDULED
Qty: 120 TABLET | Refills: 3 | Status: SHIPPED | OUTPATIENT
Start: 2025-05-19

## 2025-05-19 RX ADMIN — IBUPROFEN 800 MG: 400 TABLET, FILM COATED ORAL at 04:39

## 2025-05-19 RX ADMIN — ACETAMINOPHEN 1000 MG: 500 TABLET ORAL at 00:21

## 2025-05-19 RX ADMIN — NIFEDIPINE 60 MG: 30 TABLET, FILM COATED, EXTENDED RELEASE ORAL at 08:25

## 2025-05-19 RX ADMIN — CETIRIZINE HYDROCHLORIDE 5 MG: 10 TABLET, FILM COATED ORAL at 08:25

## 2025-05-19 RX ADMIN — DOCUSATE SODIUM 100 MG: 100 CAPSULE, LIQUID FILLED ORAL at 08:25

## 2025-05-19 RX ADMIN — ACETAMINOPHEN 1000 MG: 500 TABLET ORAL at 08:25

## 2025-05-19 ASSESSMENT — PAIN SCALES - GENERAL
PAINLEVEL_OUTOF10: 2

## 2025-05-19 ASSESSMENT — PAIN DESCRIPTION - DESCRIPTORS
DESCRIPTORS: SORE
DESCRIPTORS: SORE

## 2025-05-19 ASSESSMENT — PAIN DESCRIPTION - ORIENTATION
ORIENTATION: LOWER
ORIENTATION: LOWER

## 2025-05-19 ASSESSMENT — PAIN DESCRIPTION - LOCATION
LOCATION: ABDOMEN
LOCATION: ABDOMEN

## 2025-05-19 NOTE — DISCHARGE SUMMARY
Obstetrical Discharge Summary     Name: Jaclyn Hall MRN: [id number]  SSN: [id number]    YOB: 1995  Age: 30 y.o.  Sex: female      Admit Date: 5/15/2025    Discharge Date: 2025     Admitting Physician: Laura Rodriguez DO     Attending Physician:  Minnie Parker MD     Admission Diagnoses: 39 weeks gestation of pregnancy [Z3A.39]    Discharge Diagnoses:   Information for the patient's :  Rafael, Ankit Anaya [[id number]]   @[id number]@     Additional Diagnoses:  No components found for: \"OBEXTABORH\", \"OBEXTABSCRN\", \"OBEXTRUBELLA\", \"OBEXTGRBS\"    Hospital Course: Postpartum course was complicated by worsening blood pressures requiring initiation and titration of procardia XL, which added 1 days to the patient's length of stay.  She was discharged home on procardia 60mg XL and blood pressure parameters.     Patient Instructions:   Current Discharge Medication List        CONTINUE these medications which have NOT CHANGED    Details   Prenatal MV-Min-Fe Fum-FA-DHA (PRENATAL 1 PO) Take by mouth      aspirin 81 MG chewable tablet Take 1 tablet by mouth daily      cetirizine (ZYRTEC) 5 MG tablet Take 1 tablet by mouth daily      ibuprofen (ADVIL;MOTRIN) 600 MG tablet Take 1 tablet by mouth 3 times daily as needed for Pain  Qty: 30 tablet, Refills: 0             Disposition at Discharge: Home or self care    Condition at Discharge: Stable    Reference my discharge instructions.    No follow-ups on file.     Signed By:  Xochilt Iverson MD     May 19, 2025

## 2025-05-19 NOTE — PROGRESS NOTES
750- Bedside and Verbal shift change report given to Anisha JAMIL RN (oncoming nurse) by DANYEL Dubon RN (offgoing nurse). Report included the following information Nurse Handoff Report, Index, Adult Overview, Intake/Output, MAR, Recent Results, and Med Rec Status.     940- I have reviewed discharge instruction and reviewed medication times. Patient given copy of discharge instructions. Patient verbalizes understanding and denies any further questions at this time. Moms Under Pressure bag #338 provided to pt prior to shift, education and BP machine use completed by pt, per pt she has already signed up and filled out surveys. No further questions at this time.

## 2025-05-19 NOTE — PROGRESS NOTES
Post-Partum Day Number 3 Progress Note    Jaclyn Hall     Assessment: Doing well s/p TSVD, post partum day 3    Plan:  Preeclampsia without severe features  - Procardia increased from 30 > 60mg XL overnight  - continue 60mg procardia XL qd  - 3-5 day BP check  - home with BP cuff, calling parameters reviewed.     Postpartum Care   - Discharge home today  - Pain medication prescription(s) sent.  - Questions answered.    Dispo: Follow up in office in 3-5 days for a BP check and 6 week(s) with [facility] [place].    Information for the patient's :  aRfael, Ankit Anaya [[id number]]   Vaginal, Spontaneous   Subjective: Patient doing well without significant complaint.  Denies HA, chest pain, SOB or vision changes. Voiding without difficulty, normal lochia. Ready for discharge home.    Vitals:  BP (!) 132/93   Pulse 75   Temp 97.5 °F (36.4 °C) (Oral)   Resp 16   Ht 1.702 m (5' 7\")   Wt 93.4 kg (206 lb)   SpO2 100%   Breastfeeding Unknown   BMI 32.26 kg/m²   Temp (24hrs), Av.8 °F (36.6 °C), Min:97.5 °F (36.4 °C), Max:98.1 °F (36.7 °C)      Exam:        Patient without distress.                Fundus firm, nontender per nursing fundal checks                Perineum with normal lochia noted per nursing assessment                Lower extremities are negative for pathological edema    Labs:     Lab Results   Component Value Date/Time    WBC 10.2 05/15/2025 12:34 PM    WBC 10.0 01/15/2025 05:47 PM    WBC 7.0 2023 11:42 AM    HGB 13.4 05/15/2025 12:34 PM    HGB 11.8 01/15/2025 05:47 PM    HGB 14.3 2023 11:42 AM    HCT 38.3 05/15/2025 12:34 PM    HCT 33.7 01/15/2025 05:47 PM    HCT 44.2 2023 11:42 AM     05/15/2025 12:34 PM     01/15/2025 05:47 PM     2023 11:42 AM       No results found for this or any previous visit (from the past 24 hours).

## 2025-05-19 NOTE — PROGRESS NOTES
Bedside shift change report given to Lata Dubon RN (oncoming nurse) by Farheen Avalos RN (offgoing nurse). Report included the following information Nurse Handoff Report, MAR, Recent Results, and Event Log.

## (undated) DEVICE — HYPODERMIC SAFETY NEEDLE: Brand: MONOJECT

## (undated) DEVICE — 1LYRTR 16FR10ML100%SIL UMS SNP: Brand: MEDLINE INDUSTRIES, INC.

## (undated) DEVICE — TROCAR: Brand: KII SLEEVE

## (undated) DEVICE — KIT,ANTI FOG,W/SPONGE & FLUID,SOFT PACK: Brand: MEDLINE

## (undated) DEVICE — ELECTRO LUBE IS A SINGLE PATIENT USE DEVICE THAT IS INTENDED TO BE USED ON ELECTROSURGICAL ELECTRODES TO REDUCE STICKING.: Brand: KEY SURGICAL ELECTRO LUBE

## (undated) DEVICE — LIQUIBAND RAPID ADHESIVE 36/CS 0.8ML: Brand: MEDLINE

## (undated) DEVICE — SOLUTION IV 1000ML 0.9% SOD CHL PH 5 INJ USP VIAFLX PLAS

## (undated) DEVICE — PAD PT POS 36 IN SURGYPAD DISP

## (undated) DEVICE — COVER LT HNDL PLAS RIG 1 PER PK

## (undated) DEVICE — GYN LAPAROSCOPY-MRMC: Brand: MEDLINE INDUSTRIES, INC.

## (undated) DEVICE — GLOVE SURG SZ 6 L12IN FNGR THK79MIL GRN LTX FREE

## (undated) DEVICE — SET EXTN PRIMING 1.8ML L60IN 1.5LB MINIBOR REM SLDE CLMP

## (undated) DEVICE — SYRINGE MED 50ML LUERLOCK TIP

## (undated) DEVICE — SUTURE MCRYL SZ 4-0 L27IN ABSRB UD L19MM PS-2 1/2 CIR PRIM Y426H

## (undated) DEVICE — TROCAR: Brand: KII FIOS FIRST ENTRY

## (undated) DEVICE — GLOVE SURG SZ 6 THK91MIL LTX FREE SYN POLYISOPRENE ANTI